# Patient Record
Sex: MALE | Race: WHITE | NOT HISPANIC OR LATINO | Employment: OTHER | URBAN - METROPOLITAN AREA
[De-identification: names, ages, dates, MRNs, and addresses within clinical notes are randomized per-mention and may not be internally consistent; named-entity substitution may affect disease eponyms.]

---

## 2024-01-17 ENCOUNTER — MEDICAL CORRESPONDENCE (OUTPATIENT)
Dept: HEALTH INFORMATION MANAGEMENT | Facility: CLINIC | Age: 79
End: 2024-01-17
Payer: COMMERCIAL

## 2024-01-30 ENCOUNTER — OFFICE VISIT (OUTPATIENT)
Dept: FAMILY MEDICINE | Facility: CLINIC | Age: 79
End: 2024-01-30
Payer: COMMERCIAL

## 2024-01-30 VITALS
DIASTOLIC BLOOD PRESSURE: 64 MMHG | HEIGHT: 70 IN | OXYGEN SATURATION: 100 % | BODY MASS INDEX: 23.62 KG/M2 | SYSTOLIC BLOOD PRESSURE: 96 MMHG | WEIGHT: 165 LBS | TEMPERATURE: 97.8 F | HEART RATE: 75 BPM | RESPIRATION RATE: 16 BRPM

## 2024-01-30 DIAGNOSIS — S06.6XAD SUBARACHNOID HEMATOMA, WITH UNKNOWN LOSS OF CONSCIOUSNESS STATUS, SUBSEQUENT ENCOUNTER: ICD-10-CM

## 2024-01-30 DIAGNOSIS — S72.001A CLOSED FRACTURE OF RIGHT HIP, INITIAL ENCOUNTER (H): ICD-10-CM

## 2024-01-30 DIAGNOSIS — Z01.818 PRE-OP EXAM: Primary | ICD-10-CM

## 2024-01-30 PROBLEM — S72.009A HIP FRACTURE (H): Status: ACTIVE | Noted: 2024-01-30

## 2024-01-30 PROBLEM — S06.6XAA SUBARACHNOID HEMATOMA (H): Status: ACTIVE | Noted: 2024-01-30

## 2024-01-30 LAB
ALBUMIN SERPL BCG-MCNC: 4 G/DL (ref 3.5–5.2)
ALP SERPL-CCNC: 140 U/L (ref 40–150)
ALT SERPL W P-5'-P-CCNC: 20 U/L (ref 0–70)
ANION GAP SERPL CALCULATED.3IONS-SCNC: 9 MMOL/L (ref 7–15)
AST SERPL W P-5'-P-CCNC: 28 U/L (ref 0–45)
BILIRUB SERPL-MCNC: 0.5 MG/DL
BUN SERPL-MCNC: 13.7 MG/DL (ref 8–23)
CALCIUM SERPL-MCNC: 10 MG/DL (ref 8.8–10.2)
CHLORIDE SERPL-SCNC: 104 MMOL/L (ref 98–107)
CREAT SERPL-MCNC: 0.81 MG/DL (ref 0.67–1.17)
DEPRECATED HCO3 PLAS-SCNC: 27 MMOL/L (ref 22–29)
EGFRCR SERPLBLD CKD-EPI 2021: 90 ML/MIN/1.73M2
ERYTHROCYTE [DISTWIDTH] IN BLOOD BY AUTOMATED COUNT: 12.1 % (ref 10–15)
GLUCOSE SERPL-MCNC: 81 MG/DL (ref 70–99)
HCT VFR BLD AUTO: 44.7 % (ref 40–53)
HGB BLD-MCNC: 14.5 G/DL (ref 13.3–17.7)
MCH RBC QN AUTO: 30.4 PG (ref 26.5–33)
MCHC RBC AUTO-ENTMCNC: 32.4 G/DL (ref 31.5–36.5)
MCV RBC AUTO: 94 FL (ref 78–100)
PLATELET # BLD AUTO: 240 10E3/UL (ref 150–450)
POTASSIUM SERPL-SCNC: 4.4 MMOL/L (ref 3.4–5.3)
PROT SERPL-MCNC: 7.4 G/DL (ref 6.4–8.3)
RBC # BLD AUTO: 4.77 10E6/UL (ref 4.4–5.9)
SODIUM SERPL-SCNC: 140 MMOL/L (ref 135–145)
WBC # BLD AUTO: 6.4 10E3/UL (ref 4–11)

## 2024-01-30 PROCEDURE — 36415 COLL VENOUS BLD VENIPUNCTURE: CPT

## 2024-01-30 PROCEDURE — 99204 OFFICE O/P NEW MOD 45 MIN: CPT | Mod: 25

## 2024-01-30 PROCEDURE — 80053 COMPREHEN METABOLIC PANEL: CPT

## 2024-01-30 PROCEDURE — 85027 COMPLETE CBC AUTOMATED: CPT

## 2024-01-30 PROCEDURE — 93000 ELECTROCARDIOGRAM COMPLETE: CPT

## 2024-01-30 NOTE — LETTER
January 31, 2024      Darren Atkinson  [address]  [facility] [address]        Dear ,    We are writing to inform you of your test results.    Your test results fall within normal limits.    Resulted Orders   CBC with platelets   Result Value Ref Range    WBC Count 6.4 4.0 - 11.0 10e3/uL    RBC Count 4.77 4.40 - 5.90 10e6/uL    Hemoglobin 14.5 13.3 - 17.7 g/dL    Hematocrit 44.7 40.0 - 53.0 %    MCV 94 78 - 100 fL    MCH 30.4 26.5 - 33.0 pg    MCHC 32.4 31.5 - 36.5 g/dL    RDW 12.1 10.0 - 15.0 %    Platelet Count 240 150 - 450 10e3/uL   Comprehensive metabolic panel   Result Value Ref Range    Sodium 140 135 - 145 mmol/L      Comment:      Reference intervals for this test were updated on 09/26/2023 to more accurately reflect our healthy population. There may be differences in the flagging of prior results with similar values performed with this method. Interpretation of those prior results can be made in the context of the updated reference intervals.     Potassium 4.4 3.4 - 5.3 mmol/L    Carbon Dioxide (CO2) 27 22 - 29 mmol/L    Anion Gap 9 7 - 15 mmol/L    Urea Nitrogen 13.7 8.0 - 23.0 mg/dL    Creatinine 0.81 0.67 - 1.17 mg/dL    GFR Estimate 90 >60 mL/min/1.73m2    Calcium 10.0 8.8 - 10.2 mg/dL    Chloride 104 98 - 107 mmol/L    Glucose 81 70 - 99 mg/dL    Alkaline Phosphatase 140 40 - 150 U/L      Comment:      Reference intervals for this test were updated on 11/14/2023 to more accurately reflect our healthy population. There may be differences in the flagging of prior results with similar values performed with this method. Interpretation of those prior results can be made in the context of the updated reference intervals.    AST 28 0 - 45 U/L      Comment:      Reference intervals for this test were updated on 6/12/2023 to more accurately reflect our healthy population. There may be differences in the flagging of prior results with similar values performed with this method. Interpretation of  those prior results can be made in the context of the updated reference intervals.    ALT 20 0 - 70 U/L      Comment:      Reference intervals for this test were updated on 6/12/2023 to more accurately reflect our healthy population. There may be differences in the flagging of prior results with similar values performed with this method. Interpretation of those prior results can be made in the context of the updated reference intervals.      Protein Total 7.4 6.4 - 8.3 g/dL    Albumin 4.0 3.5 - 5.2 g/dL    Bilirubin Total 0.5 <=1.2 mg/dL       If you have any questions or concerns, please call the clinic at the number listed above.       Sincerely,      Dipti Mac DNP

## 2024-01-30 NOTE — PROGRESS NOTES
Preoperative Evaluation  [facility]  [address], [address]  [facility] [address]  Phone: [phone]  Primary Provider: No Ref-Primary, Physician  Pre-op Performing Provider: DANAY UMANA  Jan 30, 2024       Darren is a 78 year old, presenting for the following:  Pre-Op Exam      Surgical Information  Surgery/Procedure: Complex right total hip arthroplasty- posterior approach  Surgery Location:  OR  Surgeon: Dr. Trinh  Surgery Date: 02/05/24  Time of Surgery: 1000 am  Where patient plans to recover: At home with family  Fax number for surgical facility: Note does not need to be faxed, will be available electronically in Epic.    Assessment & Plan     The proposed surgical procedure is considered INTERMEDIATE risk.    Pre-op exam  -Cleared for surgery  - CBC with platelets  - Comprehensive metabolic panel  - EKG 12-lead complete w/read - Clinics    Closed fracture of right hip, initial encounter (H)  -Reason for surgery    Subarachnoid hematoma  -History of, due to a fall.  Was hospitalized at time of event but has been stable since            - No identified additional risk factors other than previously addressed    Antiplatelet or Anticoagulation Medication Instructions   - Patient is on no antiplatelet or anticoagulation medications.    Additional Medication Instructions  Patient is on no additional chronic medications    Recommendation  APPROVAL GIVEN to proceed with proposed procedure, without further diagnostic evaluation.      30 minutes spent by me on the date of the encounter doing chart review, history and exam, documentation and further activities per the note    Subjective       HPI related to upcoming procedure: Very pleasant 78-year-old male accompanied by his daughter today for preop evaluation.  Had a fall in September and recently discovered a hip fracture that he plans to undergo surgery for.  He is generally feeling well, on no chronic medications. 50 PPY  smoking history, quit in 2019. No history of lung disease, heart disease, hypertension, diabetes, or kidney disease. Had a back surgery in the past and tolerated this well without anesthesia complications. No CP, SOB, dizziness/lightheadedness, palpitations, edema. Prior to his injury in September, he was able to walk several miles without symptoms and was active. Has since been less active due to mobility issues.        1/30/2024     8:26 AM   Preop Questions   1. Have you ever had a heart attack or stroke? No   2. Have you ever had surgery on your heart or blood vessels, such as a stent placement, a coronary artery bypass, or surgery on an artery in your head, neck, heart, or legs? No   3. Do you have chest pain with activity? No   4. Do you have a history of  heart failure? No   5. Do you currently have a cold, bronchitis or symptoms of other infection? No   6. Do you have a cough, shortness of breath, or wheezing? No   7. Do you or anyone in your family have previous history of blood clots? No   8. Do you or does anyone in your family have a serious bleeding problem such as prolonged bleeding following surgeries or cuts? No   9. Have you ever had problems with anemia or been told to take iron pills? No   10. Have you had any abnormal blood loss such as black, tarry or bloody stools? No   11. Have you ever had a blood transfusion? No   12. Are you willing to have a blood transfusion if it is medically needed before, during, or after your surgery? Yes - Would only consider in emergent situation during surgery. Please address day of surgery as well.   13. Have you or any of your relatives ever had problems with anesthesia? No   14. Do you have sleep apnea, excessive snoring or daytime drowsiness? No   15. Do you have any artifical heart valves or other implanted medical devices like a pacemaker, defibrillator, or continuous glucose monitor? No   16. Do you have artificial joints? No   17. Are you allergic to latex?  "No       Health Care Directive  Patient does not have a Health Care Directive or Living Will: Discussed advance care planning with patient; however, patient declined at this time.    Preoperative Review of    reviewed - Was prescribed diazepam and Norco in September, has not used since.      Status of Chronic Conditions:  See problem list for active medical problems.  Problems all longstanding and stable, except as noted/documented.  See ROS for pertinent symptoms related to these conditions.    Patient Active Problem List    Diagnosis Date Noted    Hip fracture (H) 2024     Priority: Medium      Past Medical History:   Diagnosis Date    Elevated prostate specific antigen (PSA)     Hip fracture (H)     Subarachnoid hematoma (H) 2021     Past Surgical History:   Procedure Laterality Date    BACK SURGERY       No current outpatient medications on file.       No Known Allergies     Social History     Tobacco Use    Smoking status: Former     Packs/day: 1.00     Years: 50.00     Additional pack years: 0.00     Total pack years: 50.00     Types: Cigarettes     Quit date:      Years since quittin.0    Smokeless tobacco: Never   Substance Use Topics    Alcohol use: Not Currently       History   Drug Use Not on file         Review of Systems    Review of Systems  Constitutional, HEENT, cardiovascular, pulmonary, gi and gu systems are negative, except as otherwise noted.  Objective    BP 96/64 (BP Location: Left arm, Patient Position: Sitting, Cuff Size: Adult Regular)   Pulse 75   Temp 97.8  F (36.6  C)   Resp 16   Ht 1.778 m (5' 10\")   Wt 74.8 kg (165 lb)   SpO2 100%   BMI 23.68 kg/m     Estimated body mass index is 23.68 kg/m  as calculated from the following:    Height as of this encounter: 1.778 m (5' 10\").    Weight as of this encounter: 74.8 kg (165 lb).  Physical Exam  GENERAL: alert and no distress  EYES: Eyes grossly normal to inspection, PERRL and conjunctivae and sclerae " "normal  RESP: lungs clear to auscultation - no rales, rhonchi or wheezes  CV: regular rate and rhythm, normal S1 S2, no S3 or S4, no murmur, click or rub, no peripheral edema  ABDOMEN: soft, nontender, no hepatosplenomegaly, no masses and bowel sounds normal  MS: no gross musculoskeletal defects noted, no edema  PSYCH: mentation appears normal, affect normal/bright    No results for input(s): \"HGB\", \"PLT\", \"INR\", \"NA\", \"POTASSIUM\", \"CR\", \"A1C\" in the last 38051 hours.     Diagnostics  Labs pending at this time.  Results will be reviewed when available.   EKG: appears normal, NSR, normal axis, normal intervals, no acute ST/T changes c/w ischemia, no LVH by voltage criteria    Revised Cardiac Risk Index (RCRI)  The patient has the following serious cardiovascular risks for perioperative complications:   - No serious cardiac risks = 0 points     RCRI Interpretation: 0 points: Class I (very low risk - 0.4% complication rate)         Signed Electronically by: Dipti Mac, DNP, APRN, CNP    Copy of this evaluation report is provided to requesting physician.         "

## 2024-01-30 NOTE — PATIENT INSTRUCTIONS
1. One week prior to surgery do not take NSAIDS (Alleve, ibuprofen, aspirin, etc) or any over-the-counter pain medications other than Tylenol.  TYLENOL is the safest pain pill to use before surgery because it does not affect your bleeding time. If Tylenol is not sufficient for pain control talk to me or the surgeon and we will decide what is safe to use.     2. Do not eat anything after midnight (evening of the surgery) and nothing the morning of the surgery.     3. Follow all instructions given by the surgery team. They usually give out a packet. Read it and please follow it precisely. This helps surgical experience and outcomes.     4. If you have any questions do not hesitate to call me or the surgeon/surgical team.

## 2024-02-02 RX ORDER — ACETAMINOPHEN 325 MG/1
2 TABLET ORAL EVERY 6 HOURS PRN
Status: ON HOLD | COMMUNITY
End: 2024-02-07

## 2024-02-02 RX ORDER — POLYETHYLENE GLYCOL 3350 17 G/17G
1 POWDER, FOR SOLUTION ORAL PRN
COMMUNITY

## 2024-02-02 RX ORDER — ASPIRIN 81 MG/1
81 TABLET ORAL DAILY
Status: ON HOLD | COMMUNITY
End: 2024-02-07

## 2024-02-02 RX ORDER — SENNOSIDES 8.6 MG
1 TABLET ORAL DAILY PRN
Status: ON HOLD | COMMUNITY
End: 2024-02-07

## 2024-02-02 RX ORDER — GUAIFENESIN AND DEXTROMETHORPHAN HYDROBROMIDE 100; 10 MG/5ML; MG/5ML
10 SOLUTION ORAL EVERY 4 HOURS PRN
COMMUNITY

## 2024-02-02 RX ORDER — MECLIZINE HYDROCHLORIDE 25 MG/1
1 TABLET ORAL 3 TIMES DAILY PRN
COMMUNITY

## 2024-02-02 NOTE — PROGRESS NOTES
PTA medications updated by Medication Scribe prior to surgery via phone call with patient (last doses completed by Nurse)     Medication history sources: H&P and (Nurse Melanie @ [place], [place]  [phone])  In the past week, patient estimated taking medication this percent of the time: Greater than 90%      Significant changes made to the medication list:  None      Additional medication history information:   None    Medication reconciliation completed by provider prior to medication history? No    Time spent in this activity: 25 minutes    The information provided in this note is only as accurate as the sources available at the time of update(s)      Prior to Admission medications    Medication Sig Last Dose Taking? Auth Provider Long Term End Date   acetaminophen (TYLENOL) 325 MG tablet Take 2 tablets by mouth every 6 hours as needed for mild pain Unknown at prn Yes Reported, Patient     aspirin 81 MG EC tablet Take 81 mg by mouth daily  at am Yes Reported, Patient     Dextromethorphan-guaiFENesin  MG/5ML syrup Take 10 mLs by mouth every 4 hours as needed for cough Unknown at prn Yes Reported, Patient     meclizine (ANTIVERT) 25 MG tablet Take 1 tablet by mouth 3 times daily as needed for dizziness Unknown at prn Yes Reported, Patient     polyethylene glycol (MIRALAX) 17 g packet Take 1 packet by mouth as needed for constipation Unknown at prn Yes Reported, Patient     sennosides (SENOKOT) 8.6 MG tablet Take 1 tablet by mouth daily as needed for constipation Unknown at prn Yes Reported, Patient         Medication history completed by: Mercedes Mustafa LPN

## 2024-02-05 ENCOUNTER — ANESTHESIA (OUTPATIENT)
Dept: SURGERY | Facility: CLINIC | Age: 79
DRG: 522 | End: 2024-02-05
Payer: MEDICARE

## 2024-02-05 ENCOUNTER — APPOINTMENT (OUTPATIENT)
Dept: GENERAL RADIOLOGY | Facility: CLINIC | Age: 79
DRG: 522 | End: 2024-02-05
Attending: PHYSICIAN ASSISTANT
Payer: MEDICARE

## 2024-02-05 ENCOUNTER — APPOINTMENT (OUTPATIENT)
Dept: PHYSICAL THERAPY | Facility: CLINIC | Age: 79
DRG: 522 | End: 2024-02-05
Attending: PHYSICIAN ASSISTANT
Payer: MEDICARE

## 2024-02-05 ENCOUNTER — ANESTHESIA EVENT (OUTPATIENT)
Dept: SURGERY | Facility: CLINIC | Age: 79
DRG: 522 | End: 2024-02-05
Payer: MEDICARE

## 2024-02-05 ENCOUNTER — HOSPITAL ENCOUNTER (INPATIENT)
Facility: CLINIC | Age: 79
LOS: 2 days | Discharge: HOME | DRG: 522 | End: 2024-02-08
Attending: ORTHOPAEDIC SURGERY | Admitting: ORTHOPAEDIC SURGERY
Payer: MEDICARE

## 2024-02-05 DIAGNOSIS — R23.9 ALTERATION IN SKIN INTEGRITY: ICD-10-CM

## 2024-02-05 DIAGNOSIS — Z96.641 S/P TOTAL RIGHT HIP ARTHROPLASTY: Primary | ICD-10-CM

## 2024-02-05 PROBLEM — Z96.649 S/P TOTAL HIP ARTHROPLASTY: Status: ACTIVE | Noted: 2024-02-05

## 2024-02-05 PROCEDURE — 97116 GAIT TRAINING THERAPY: CPT | Mod: GP

## 2024-02-05 PROCEDURE — 258N000003 HC RX IP 258 OP 636: Performed by: SURGERY

## 2024-02-05 PROCEDURE — P9045 ALBUMIN (HUMAN), 5%, 250 ML: HCPCS | Mod: JZ | Performed by: ANESTHESIOLOGY

## 2024-02-05 PROCEDURE — 250N000013 HC RX MED GY IP 250 OP 250 PS 637: Performed by: PHYSICIAN ASSISTANT

## 2024-02-05 PROCEDURE — 97161 PT EVAL LOW COMPLEX 20 MIN: CPT | Mod: GP

## 2024-02-05 PROCEDURE — C1776 JOINT DEVICE (IMPLANTABLE): HCPCS | Performed by: ORTHOPAEDIC SURGERY

## 2024-02-05 PROCEDURE — 250N000011 HC RX IP 250 OP 636: Performed by: PHYSICIAN ASSISTANT

## 2024-02-05 PROCEDURE — 250N000009 HC RX 250: Performed by: REGISTERED NURSE

## 2024-02-05 PROCEDURE — 272N000001 HC OR GENERAL SUPPLY STERILE: Performed by: ORTHOPAEDIC SURGERY

## 2024-02-05 PROCEDURE — 250N000009 HC RX 250: Performed by: ORTHOPAEDIC SURGERY

## 2024-02-05 PROCEDURE — 97530 THERAPEUTIC ACTIVITIES: CPT | Mod: GP

## 2024-02-05 PROCEDURE — 0SR901A REPLACEMENT OF RIGHT HIP JOINT WITH METAL SYNTHETIC SUBSTITUTE, UNCEMENTED, OPEN APPROACH: ICD-10-PCS | Performed by: ORTHOPAEDIC SURGERY

## 2024-02-05 PROCEDURE — 258N000003 HC RX IP 258 OP 636: Performed by: PHYSICIAN ASSISTANT

## 2024-02-05 PROCEDURE — 250N000011 HC RX IP 250 OP 636: Performed by: REGISTERED NURSE

## 2024-02-05 PROCEDURE — C1713 ANCHOR/SCREW BN/BN,TIS/BN: HCPCS | Performed by: ORTHOPAEDIC SURGERY

## 2024-02-05 PROCEDURE — 250N000011 HC RX IP 250 OP 636: Performed by: ANESTHESIOLOGY

## 2024-02-05 PROCEDURE — 710N000009 HC RECOVERY PHASE 1, LEVEL 1, PER MIN: Performed by: ORTHOPAEDIC SURGERY

## 2024-02-05 PROCEDURE — 360N000077 HC SURGERY LEVEL 4, PER MIN: Performed by: ORTHOPAEDIC SURGERY

## 2024-02-05 PROCEDURE — 370N000017 HC ANESTHESIA TECHNICAL FEE, PER MIN: Performed by: ORTHOPAEDIC SURGERY

## 2024-02-05 PROCEDURE — 258N000003 HC RX IP 258 OP 636: Performed by: REGISTERED NURSE

## 2024-02-05 PROCEDURE — 250N000011 HC RX IP 250 OP 636: Performed by: ORTHOPAEDIC SURGERY

## 2024-02-05 PROCEDURE — 250N000013 HC RX MED GY IP 250 OP 250 PS 637: Performed by: ORTHOPAEDIC SURGERY

## 2024-02-05 PROCEDURE — 999N000065 XR PELVIS AND HIP PORTABLE RIGHT 1 VIEW

## 2024-02-05 PROCEDURE — 258N000003 HC RX IP 258 OP 636: Performed by: ORTHOPAEDIC SURGERY

## 2024-02-05 PROCEDURE — 999N000141 HC STATISTIC PRE-PROCEDURE NURSING ASSESSMENT: Performed by: ORTHOPAEDIC SURGERY

## 2024-02-05 PROCEDURE — 258N000001 HC RX 258: Performed by: ORTHOPAEDIC SURGERY

## 2024-02-05 PROCEDURE — 250N000011 HC RX IP 250 OP 636: Mod: JZ | Performed by: ANESTHESIOLOGY

## 2024-02-05 DEVICE — IMPLANTABLE DEVICE
Type: IMPLANTABLE DEVICE | Site: HIP | Status: FUNCTIONAL
Brand: G7® DUAL MOBILITY ACETABULAR SYSTEM

## 2024-02-05 DEVICE — IMP SLEEVE BIOM TYPE1 TPR FOR BIOLOX DELTA +6MM 650-1068: Type: IMPLANTABLE DEVICE | Site: HIP | Status: FUNCTIONAL

## 2024-02-05 DEVICE — IMPLANTABLE DEVICE
Type: IMPLANTABLE DEVICE | Site: HIP | Status: FUNCTIONAL
Brand: ARCOS MODULAR REVISION HIP SYSTEM

## 2024-02-05 DEVICE — IMPLANTABLE DEVICE: Type: IMPLANTABLE DEVICE | Site: HIP | Status: FUNCTIONAL

## 2024-02-05 DEVICE — IMPLANTABLE DEVICE
Type: IMPLANTABLE DEVICE | Site: HIP | Status: FUNCTIONAL
Brand: VIVACIT-E®

## 2024-02-05 DEVICE — IMP HEAD FEM BIOM BIOLOX DELTA OPTION 28MM 650-1055: Type: IMPLANTABLE DEVICE | Site: HIP | Status: FUNCTIONAL

## 2024-02-05 RX ORDER — DEXMEDETOMIDINE HYDROCHLORIDE 4 UG/ML
INJECTION, SOLUTION INTRAVENOUS PRN
Status: DISCONTINUED | OUTPATIENT
Start: 2024-02-05 | End: 2024-02-05

## 2024-02-05 RX ORDER — TRANEXAMIC ACID 650 MG/1
1950 TABLET ORAL ONCE
Status: COMPLETED | OUTPATIENT
Start: 2024-02-05 | End: 2024-02-05

## 2024-02-05 RX ORDER — MAGNESIUM HYDROXIDE 1200 MG/15ML
LIQUID ORAL PRN
Status: DISCONTINUED | OUTPATIENT
Start: 2024-02-05 | End: 2024-02-05 | Stop reason: HOSPADM

## 2024-02-05 RX ORDER — NALOXONE HYDROCHLORIDE 0.4 MG/ML
0.4 INJECTION, SOLUTION INTRAMUSCULAR; INTRAVENOUS; SUBCUTANEOUS
Status: DISCONTINUED | OUTPATIENT
Start: 2024-02-05 | End: 2024-02-08 | Stop reason: HOSPADM

## 2024-02-05 RX ORDER — CEFAZOLIN SODIUM 1 G/3ML
1 INJECTION, POWDER, FOR SOLUTION INTRAMUSCULAR; INTRAVENOUS EVERY 8 HOURS
Qty: 10 ML | Refills: 0 | Status: ACTIVE | OUTPATIENT
Start: 2024-02-05 | End: 2024-02-06

## 2024-02-05 RX ORDER — PROPOFOL 10 MG/ML
INJECTION, EMULSION INTRAVENOUS CONTINUOUS PRN
Status: DISCONTINUED | OUTPATIENT
Start: 2024-02-05 | End: 2024-02-05

## 2024-02-05 RX ORDER — HYDROMORPHONE HYDROCHLORIDE 2 MG/1
1-2 TABLET ORAL EVERY 4 HOURS PRN
Qty: 25 TABLET | Refills: 0 | Status: SHIPPED | OUTPATIENT
Start: 2024-02-05 | End: 2024-02-07

## 2024-02-05 RX ORDER — FENTANYL CITRATE 0.05 MG/ML
50 INJECTION, SOLUTION INTRAMUSCULAR; INTRAVENOUS EVERY 5 MIN PRN
Status: DISCONTINUED | OUTPATIENT
Start: 2024-02-05 | End: 2024-02-05 | Stop reason: HOSPADM

## 2024-02-05 RX ORDER — CEFAZOLIN SODIUM/WATER 2 G/20 ML
2 SYRINGE (ML) INTRAVENOUS
Status: COMPLETED | OUTPATIENT
Start: 2024-02-05 | End: 2024-02-05

## 2024-02-05 RX ORDER — NALOXONE HYDROCHLORIDE 0.4 MG/ML
0.2 INJECTION, SOLUTION INTRAMUSCULAR; INTRAVENOUS; SUBCUTANEOUS
Status: DISCONTINUED | OUTPATIENT
Start: 2024-02-05 | End: 2024-02-08 | Stop reason: HOSPADM

## 2024-02-05 RX ORDER — ASPIRIN 81 MG/1
81 TABLET ORAL 2 TIMES DAILY
Qty: 60 TABLET | Refills: 0 | Status: SHIPPED | OUTPATIENT
Start: 2024-02-05

## 2024-02-05 RX ORDER — ONDANSETRON 2 MG/ML
INJECTION INTRAMUSCULAR; INTRAVENOUS PRN
Status: DISCONTINUED | OUTPATIENT
Start: 2024-02-05 | End: 2024-02-05

## 2024-02-05 RX ORDER — ACETAMINOPHEN 325 MG/1
975 TABLET ORAL ONCE
Status: COMPLETED | OUTPATIENT
Start: 2024-02-05 | End: 2024-02-05

## 2024-02-05 RX ORDER — ACETAMINOPHEN 325 MG/1
650 TABLET ORAL EVERY 4 HOURS PRN
Status: DISCONTINUED | OUTPATIENT
Start: 2024-02-08 | End: 2024-02-08 | Stop reason: HOSPADM

## 2024-02-05 RX ORDER — FENTANYL CITRATE 0.05 MG/ML
25 INJECTION, SOLUTION INTRAMUSCULAR; INTRAVENOUS EVERY 5 MIN PRN
Status: DISCONTINUED | OUTPATIENT
Start: 2024-02-05 | End: 2024-02-05 | Stop reason: HOSPADM

## 2024-02-05 RX ORDER — DEXAMETHASONE SODIUM PHOSPHATE 4 MG/ML
INJECTION, SOLUTION INTRA-ARTICULAR; INTRALESIONAL; INTRAMUSCULAR; INTRAVENOUS; SOFT TISSUE PRN
Status: DISCONTINUED | OUTPATIENT
Start: 2024-02-05 | End: 2024-02-05

## 2024-02-05 RX ORDER — AMOXICILLIN 250 MG
1-2 CAPSULE ORAL 2 TIMES DAILY
Qty: 30 TABLET | Refills: 0 | Status: SHIPPED | OUTPATIENT
Start: 2024-02-05

## 2024-02-05 RX ORDER — ONDANSETRON 2 MG/ML
4 INJECTION INTRAMUSCULAR; INTRAVENOUS EVERY 6 HOURS PRN
Status: DISCONTINUED | OUTPATIENT
Start: 2024-02-05 | End: 2024-02-08 | Stop reason: HOSPADM

## 2024-02-05 RX ORDER — MECLIZINE HYDROCHLORIDE 25 MG/1
25 TABLET ORAL 3 TIMES DAILY PRN
Status: DISCONTINUED | OUTPATIENT
Start: 2024-02-05 | End: 2024-02-08 | Stop reason: HOSPADM

## 2024-02-05 RX ORDER — POLYETHYLENE GLYCOL 3350 17 G/17G
1 POWDER, FOR SOLUTION ORAL DAILY
Qty: 7 PACKET | Refills: 0 | Status: SHIPPED | OUTPATIENT
Start: 2024-02-05

## 2024-02-05 RX ORDER — LIDOCAINE HYDROCHLORIDE 20 MG/ML
INJECTION, SOLUTION INFILTRATION; PERINEURAL PRN
Status: DISCONTINUED | OUTPATIENT
Start: 2024-02-05 | End: 2024-02-05

## 2024-02-05 RX ORDER — ASPIRIN 81 MG/1
81 TABLET ORAL 2 TIMES DAILY
Status: DISCONTINUED | OUTPATIENT
Start: 2024-02-05 | End: 2024-02-08 | Stop reason: HOSPADM

## 2024-02-05 RX ORDER — HYDROMORPHONE HCL IN WATER/PF 6 MG/30 ML
0.2 PATIENT CONTROLLED ANALGESIA SYRINGE INTRAVENOUS EVERY 5 MIN PRN
Status: DISCONTINUED | OUTPATIENT
Start: 2024-02-05 | End: 2024-02-05 | Stop reason: HOSPADM

## 2024-02-05 RX ORDER — PROCHLORPERAZINE MALEATE 5 MG
5 TABLET ORAL EVERY 6 HOURS PRN
Status: DISCONTINUED | OUTPATIENT
Start: 2024-02-05 | End: 2024-02-08 | Stop reason: HOSPADM

## 2024-02-05 RX ORDER — GUAIFENESIN AND DEXTROMETHORPHAN HYDROBROMIDE 100; 10 MG/5ML; MG/5ML
10 SOLUTION ORAL EVERY 4 HOURS PRN
Status: DISCONTINUED | OUTPATIENT
Start: 2024-02-05 | End: 2024-02-08 | Stop reason: HOSPADM

## 2024-02-05 RX ORDER — EPHEDRINE SULFATE 50 MG/ML
INJECTION, SOLUTION INTRAMUSCULAR; INTRAVENOUS; SUBCUTANEOUS PRN
Status: DISCONTINUED | OUTPATIENT
Start: 2024-02-05 | End: 2024-02-05

## 2024-02-05 RX ORDER — ONDANSETRON 4 MG/1
4 TABLET, ORALLY DISINTEGRATING ORAL EVERY 6 HOURS PRN
Status: DISCONTINUED | OUTPATIENT
Start: 2024-02-05 | End: 2024-02-08 | Stop reason: HOSPADM

## 2024-02-05 RX ORDER — SODIUM CHLORIDE, SODIUM LACTATE, POTASSIUM CHLORIDE, CALCIUM CHLORIDE 600; 310; 30; 20 MG/100ML; MG/100ML; MG/100ML; MG/100ML
INJECTION, SOLUTION INTRAVENOUS CONTINUOUS
Status: DISCONTINUED | OUTPATIENT
Start: 2024-02-05 | End: 2024-02-05 | Stop reason: HOSPADM

## 2024-02-05 RX ORDER — POLYETHYLENE GLYCOL 3350 17 G/17G
17 POWDER, FOR SOLUTION ORAL DAILY
Status: DISCONTINUED | OUTPATIENT
Start: 2024-02-06 | End: 2024-02-08 | Stop reason: HOSPADM

## 2024-02-05 RX ORDER — ONDANSETRON 4 MG/1
4 TABLET, ORALLY DISINTEGRATING ORAL EVERY 30 MIN PRN
Status: DISCONTINUED | OUTPATIENT
Start: 2024-02-05 | End: 2024-02-05 | Stop reason: HOSPADM

## 2024-02-05 RX ORDER — BISACODYL 10 MG
10 SUPPOSITORY, RECTAL RECTAL DAILY PRN
Status: DISCONTINUED | OUTPATIENT
Start: 2024-02-05 | End: 2024-02-08 | Stop reason: HOSPADM

## 2024-02-05 RX ORDER — CEFAZOLIN SODIUM/WATER 2 G/20 ML
2 SYRINGE (ML) INTRAVENOUS SEE ADMIN INSTRUCTIONS
Status: DISCONTINUED | OUTPATIENT
Start: 2024-02-05 | End: 2024-02-05 | Stop reason: HOSPADM

## 2024-02-05 RX ORDER — HYDROMORPHONE HCL IN WATER/PF 6 MG/30 ML
0.2 PATIENT CONTROLLED ANALGESIA SYRINGE INTRAVENOUS
Status: DISCONTINUED | OUTPATIENT
Start: 2024-02-05 | End: 2024-02-08 | Stop reason: HOSPADM

## 2024-02-05 RX ORDER — BUPIVACAINE HYDROCHLORIDE 7.5 MG/ML
INJECTION, SOLUTION INTRASPINAL
Status: DISCONTINUED | OUTPATIENT
Start: 2024-02-05 | End: 2024-02-05

## 2024-02-05 RX ORDER — SODIUM CHLORIDE 9 MG/ML
INJECTION, SOLUTION INTRAVENOUS CONTINUOUS
Status: DISCONTINUED | OUTPATIENT
Start: 2024-02-05 | End: 2024-02-08 | Stop reason: HOSPADM

## 2024-02-05 RX ORDER — HYDROMORPHONE HCL IN WATER/PF 6 MG/30 ML
0.4 PATIENT CONTROLLED ANALGESIA SYRINGE INTRAVENOUS
Status: DISCONTINUED | OUTPATIENT
Start: 2024-02-05 | End: 2024-02-08 | Stop reason: HOSPADM

## 2024-02-05 RX ORDER — ACETAMINOPHEN 325 MG/1
650 TABLET ORAL EVERY 4 HOURS PRN
Qty: 100 TABLET | Refills: 0 | Status: SHIPPED | OUTPATIENT
Start: 2024-02-05

## 2024-02-05 RX ORDER — ACETAMINOPHEN 325 MG/1
975 TABLET ORAL EVERY 8 HOURS
Qty: 27 TABLET | Refills: 0 | Status: COMPLETED | OUTPATIENT
Start: 2024-02-05 | End: 2024-02-08

## 2024-02-05 RX ORDER — AMOXICILLIN 250 MG
1 CAPSULE ORAL 2 TIMES DAILY
Status: DISCONTINUED | OUTPATIENT
Start: 2024-02-05 | End: 2024-02-08 | Stop reason: HOSPADM

## 2024-02-05 RX ORDER — HYDROMORPHONE HYDROCHLORIDE 2 MG/1
2 TABLET ORAL EVERY 4 HOURS PRN
Status: DISCONTINUED | OUTPATIENT
Start: 2024-02-05 | End: 2024-02-08 | Stop reason: HOSPADM

## 2024-02-05 RX ORDER — ONDANSETRON 2 MG/ML
4 INJECTION INTRAMUSCULAR; INTRAVENOUS EVERY 30 MIN PRN
Status: DISCONTINUED | OUTPATIENT
Start: 2024-02-05 | End: 2024-02-05 | Stop reason: HOSPADM

## 2024-02-05 RX ORDER — METHOCARBAMOL 500 MG/1
500 TABLET, FILM COATED ORAL EVERY 6 HOURS PRN
Status: DISCONTINUED | OUTPATIENT
Start: 2024-02-05 | End: 2024-02-08 | Stop reason: HOSPADM

## 2024-02-05 RX ORDER — LIDOCAINE 40 MG/G
CREAM TOPICAL
Status: DISCONTINUED | OUTPATIENT
Start: 2024-02-05 | End: 2024-02-08 | Stop reason: HOSPADM

## 2024-02-05 RX ORDER — PROPOFOL 10 MG/ML
INJECTION, EMULSION INTRAVENOUS PRN
Status: DISCONTINUED | OUTPATIENT
Start: 2024-02-05 | End: 2024-02-05

## 2024-02-05 RX ORDER — HYDROMORPHONE HCL IN WATER/PF 6 MG/30 ML
0.4 PATIENT CONTROLLED ANALGESIA SYRINGE INTRAVENOUS EVERY 5 MIN PRN
Status: DISCONTINUED | OUTPATIENT
Start: 2024-02-05 | End: 2024-02-05 | Stop reason: HOSPADM

## 2024-02-05 RX ADMIN — PHENYLEPHRINE HYDROCHLORIDE 100 MCG: 10 INJECTION INTRAVENOUS at 10:41

## 2024-02-05 RX ADMIN — SODIUM CHLORIDE, POTASSIUM CHLORIDE, SODIUM LACTATE AND CALCIUM CHLORIDE: 600; 310; 30; 20 INJECTION, SOLUTION INTRAVENOUS at 09:15

## 2024-02-05 RX ADMIN — ONDANSETRON 4 MG: 2 INJECTION INTRAMUSCULAR; INTRAVENOUS at 12:05

## 2024-02-05 RX ADMIN — PHENYLEPHRINE HYDROCHLORIDE 0.5 MCG/KG/MIN: 10 INJECTION INTRAVENOUS at 10:44

## 2024-02-05 RX ADMIN — ACETAMINOPHEN 975 MG: 325 TABLET, FILM COATED ORAL at 16:16

## 2024-02-05 RX ADMIN — LIDOCAINE HYDROCHLORIDE 60 MG: 20 INJECTION, SOLUTION INFILTRATION; PERINEURAL at 10:16

## 2024-02-05 RX ADMIN — DEXAMETHASONE SODIUM PHOSPHATE 10 MG: 4 INJECTION, SOLUTION INTRA-ARTICULAR; INTRALESIONAL; INTRAMUSCULAR; INTRAVENOUS; SOFT TISSUE at 10:17

## 2024-02-05 RX ADMIN — SENNOSIDES AND DOCUSATE SODIUM 1 TABLET: 50; 8.6 TABLET ORAL at 20:14

## 2024-02-05 RX ADMIN — TRANEXAMIC ACID 1950 MG: 650 TABLET ORAL at 09:17

## 2024-02-05 RX ADMIN — PHENYLEPHRINE HYDROCHLORIDE 100 MCG: 10 INJECTION INTRAVENOUS at 10:33

## 2024-02-05 RX ADMIN — ALBUMIN (HUMAN) 12.5 G: 12.5 SOLUTION INTRAVENOUS at 13:15

## 2024-02-05 RX ADMIN — BUPIVACAINE HYDROCHLORIDE IN DEXTROSE 1.6 ML: 7.5 INJECTION, SOLUTION SUBARACHNOID at 10:16

## 2024-02-05 RX ADMIN — CEFAZOLIN 1 G: 1 INJECTION, POWDER, FOR SOLUTION INTRAMUSCULAR; INTRAVENOUS at 17:38

## 2024-02-05 RX ADMIN — ACETAMINOPHEN 975 MG: 325 TABLET, FILM COATED ORAL at 09:17

## 2024-02-05 RX ADMIN — PHENYLEPHRINE HYDROCHLORIDE 100 MCG: 10 INJECTION INTRAVENOUS at 10:46

## 2024-02-05 RX ADMIN — SODIUM CHLORIDE: 9 INJECTION, SOLUTION INTRAVENOUS at 16:13

## 2024-02-05 RX ADMIN — PHENYLEPHRINE HYDROCHLORIDE 100 MCG: 10 INJECTION INTRAVENOUS at 10:24

## 2024-02-05 RX ADMIN — PROPOFOL 10 MG: 10 INJECTION, EMULSION INTRAVENOUS at 10:10

## 2024-02-05 RX ADMIN — Medication 5 MG: at 10:55

## 2024-02-05 RX ADMIN — SODIUM CHLORIDE, POTASSIUM CHLORIDE, SODIUM LACTATE AND CALCIUM CHLORIDE: 600; 310; 30; 20 INJECTION, SOLUTION INTRAVENOUS at 11:51

## 2024-02-05 RX ADMIN — PROPOFOL 150 MCG/KG/MIN: 10 INJECTION, EMULSION INTRAVENOUS at 10:16

## 2024-02-05 RX ADMIN — Medication 5 MG: at 10:32

## 2024-02-05 RX ADMIN — ASPIRIN 81 MG: 81 TABLET, COATED ORAL at 20:14

## 2024-02-05 RX ADMIN — PROPOFOL 10 MG: 10 INJECTION, EMULSION INTRAVENOUS at 12:14

## 2024-02-05 RX ADMIN — Medication 2 G: at 10:04

## 2024-02-05 RX ADMIN — PHENYLEPHRINE HYDROCHLORIDE 100 MCG: 10 INJECTION INTRAVENOUS at 10:37

## 2024-02-05 RX ADMIN — DEXMEDETOMIDINE HYDROCHLORIDE 8 MCG: 200 INJECTION INTRAVENOUS at 10:10

## 2024-02-05 ASSESSMENT — ENCOUNTER SYMPTOMS
SEIZURES: 0
ORTHOPNEA: 0

## 2024-02-05 ASSESSMENT — ACTIVITIES OF DAILY LIVING (ADL)
ADLS_ACUITY_SCORE: 23
ADLS_ACUITY_SCORE: 29
ADLS_ACUITY_SCORE: 23
ADLS_ACUITY_SCORE: 35
ADLS_ACUITY_SCORE: 23
ADLS_ACUITY_SCORE: 29

## 2024-02-05 ASSESSMENT — LIFESTYLE VARIABLES: TOBACCO_USE: 1

## 2024-02-05 NOTE — ANESTHESIA POSTPROCEDURE EVALUATION
Patient: Darren Atkinson    Procedure: Procedure(s):  complex right total hip arthroplasty - posterior approach       Anesthesia Type:  Spinal    Note:  Disposition: Admission   Postop Pain Control: Uneventful            Sign Out: Well controlled pain   PONV: No   Neuro/Psych: Uneventful            Sign Out: Acceptable/Baseline neuro status   Airway/Respiratory: Uneventful            Sign Out: Acceptable/Baseline resp. status   CV/Hemodynamics: Uneventful            Sign Out: Acceptable CV status; No obvious hypovolemia; No obvious fluid overload   Other NRE: NONE   DID A NON-ROUTINE EVENT OCCUR? No           Last vitals:  Vitals Value Taken Time   BP 88/61 02/05/24 1445   Temp 36.3  C (97.4  F) 02/05/24 1430   Pulse 77 02/05/24 1448   Resp 45 02/05/24 1448   SpO2 97 % 02/05/24 1448   Vitals shown include unfiled device data.    Electronically Signed By: Dipti Spring MD  February 5, 2024  2:49 PM

## 2024-02-05 NOTE — PROGRESS NOTES
Provided care during RN break. Pt. Alert to self and place. Denies pain and no nonverbal signs of pain. Pt. Moving extremities, cool feet, color WNL, Weak pedal pulses, difficulty finding left pedal pulses. MD Trinh assessed, no concerns. SBP in 80s and 90s. Baseline of 90s. Due to void. Report given. Attempted to call daughter to update, went to voicemail.

## 2024-02-05 NOTE — BRIEF OP NOTE
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  Complications: None.  Implants:   Implant Name Type Inv. Item Serial No.  Lot No. LRB No. Used Action   SHELL G7 OSSEO TI MULTIHOLE ACET 56MM - SN/A Total Joint Component/Insert SHELL G7 OSSEO TI MULTIHOLE ACET 56MM N/A [employer] [id number] Right 1 Implanted   IMP SCR ZIM 6.5X25MM ACET CUP SELF TAP -581-25 - [id number] Metallic Hardware/Gustavus IMP SCR ZIM 6.5X25MM ACET CUP SELF TAP -065-25  [employer] [id number] Right 1 Implanted   IMP SCR ZIM 6.5X30MM ACET CUP SELF TAP -065-30 - [id number] Metallic Hardware/Gustavus IMP SCR ZIM 6.5X30MM ACET CUP SELF TAP -992-30  [employer] [id number] Right 1 Implanted   IMP SCR ZIM 6.5X25MM ACET CUP SELF TAP -963-25 - [id number] Metallic Hardware/Gustavus IMP SCR ZIM 6.5X25MM ACET CUP SELF TAP -079-25  [employer] [id number] Right 1 Implanted   IMP SCR ZIM 6.5X25MM ACET CUP SELF TAP -369-25 - [id number] Metallic Hardware/Gustavus IMP SCR ZIM 6.5X25MM ACET CUP SELF TAP -823-25  [employer] [id number] Right 1 Implanted   STS DISTAL STEM ALDA MODULAR REVISION SYSTEM   N/A  [id number] Right 1 Implanted   LINER ACTB G7 F 44MM COCR HIP 2 MBL - [id number] Total Joint Component/Insert LINER ACTB G7 F 44MM COCR HIP 2 MBL  [employer] [id number] Right 1 Implanted   IMP CONE PROX BODY BIOMET HI OFFSET TYPE 1 TPR SZ A 50MM - SN/A Total Joint Component/Insert IMP CONE PROX BODY BIOMET HI OFFSET TYPE 1  TPR SZ A 50MM N/A [employer] [id number] Right 1 Implanted   BEARING HIP 44MM 28MM F VIVACIT-E LUM STRL LF - [id number] Total Joint Component/Insert BEARING HIP 44MM 28MM F VIVACIT-E LUM STRL LF  [employer] [id number] Right 1 Implanted   IMP HEAD FEM BIOM BIOLOX DELTA OPTION 28MM 650-1055 - [id number] Total Joint Component/Insert IMP HEAD FEM BIOM BIOLOX DELTA OPTION 28MM 650-1055  [employer] [id number] Right 1 Implanted   IMP SLEEVE BIOM TYPE1 TPR FOR BIOLOX DELTA +6MM 650-1068 - [id number] Total Joint Component/Insert IMP SLEEVE BIOM TYPE1 TPR FOR BIOLOX DELTA +6MM 650-1068  [employer] [id number] Right 1 Implanted

## 2024-02-05 NOTE — ANESTHESIA PROCEDURE NOTES
"Intrathecal injection Procedure Note    Pre-Procedure   Staff -        Anesthesiologist:  Dipti Spring MD       Performed By: anesthesiologist       Location: OR       Pre-Anesthestic Checklist: patient identified, IV checked, risks and benefits discussed, informed consent, monitors and equipment checked, pre-op evaluation, at physician/surgeon's request and post-op pain management  Timeout:       Correct Patient: Yes        Correct Procedure: Yes        Correct Site: Yes        Correct Position: Yes   Procedure Documentation  Procedure: intrathecal injection       Diagnosis: osteoarthritis       Patient Position: sitting       Patient Prep/Sterile Barriers: sterile gloves, mask, patient draped       Skin prep: Chloraprep       Insertion Site: L3-4. (midline approach).       Needle Gauge: 24.        Needle Length (Inches): 3.5        Spinal Needle Type: Pencan       Introducer used       Introducer: 20 G       # of attempts: 1 and  # of redirects:  1    Assessment/Narrative         Paresthesias: No.       CSF fluid: clear.    Medication(s) Administered   0.75% Hyperbaric Bupivacaine (Intrathecal) - Intrathecal   1.6 mL - 2/5/2024 10:16:00 AM    FOR [facility] ([facility] ONLY:   Pain Team Contact information: please page the Pain Team Via [employer]. Search \"Pain\". During daytime hours, please page the attending first. At night please page the resident first.      "

## 2024-02-05 NOTE — ANESTHESIA PREPROCEDURE EVALUATION
Anesthesia Pre-Procedure Evaluation    Patient: Darren Atkinson   MRN: [id number] : 1945        Procedure : Procedure(s):  complex right total hip arthroplasty - posterior approach          Past Medical History:   Diagnosis Date    Elevated prostate specific antigen (PSA)     Hip fracture (H)     Memory loss     Subarachnoid hematoma (H) 2021      Past Surgical History:   Procedure Laterality Date    BACK SURGERY        Allergies   Allergen Reactions    Morphine Hallucination    Oxycodone Hallucination      Social History     Tobacco Use    Smoking status: Former     Packs/day: 1.00     Years: 50.00     Additional pack years: 0.00     Total pack years: 50.00     Types: Cigarettes     Quit date:      Years since quittin.0    Smokeless tobacco: Never   Substance Use Topics    Alcohol use: Not Currently      Wt Readings from Last 1 Encounters:   24 74.8 kg (165 lb)        Anesthesia Evaluation   Pt has had prior anesthetic.     No history of anesthetic complications       ROS/MED HX  ENT/Pulmonary:     (+)                tobacco use, Past use,                    (-) asthma and recent URI   Neurologic: Comment: Subarachnoid hematoma   (-) no seizures, no CVA and no TIA   Cardiovascular:    (-) orthopnea/PND   METS/Exercise Tolerance: >4 METS    Hematologic:    (-) history of blood clots   Musculoskeletal:   (+)  arthritis,   fracture (RIGHT hip),          GI/Hepatic:    (-) GERD and liver disease   Renal/Genitourinary:    (-) renal disease   Endo:    (-) Type II DM and obesity   Psychiatric/Substance Use:       Infectious Disease:    (-) Recent Fever   Malignancy:       Other:            Physical Exam    Airway        Mallampati: II   TM distance: > 3 FB   Neck ROM: full   Mouth opening: > 3 cm    Respiratory Devices and Support         Dental       (+) Minor Abnormalities - some fillings, tiny chips      Cardiovascular          Rhythm and rate: regular and normal     Pulmonary            "breath sounds clear to auscultation           OUTSIDE LABS:  CBC:   Lab Results   Component Value Date    WBC 6.4 01/30/2024    HGB 14.5 01/30/2024    HCT 44.7 01/30/2024     01/30/2024     BMP:   Lab Results   Component Value Date     01/30/2024    POTASSIUM 4.4 01/30/2024    CHLORIDE 104 01/30/2024    CO2 27 01/30/2024    BUN 13.7 01/30/2024    CR 0.81 01/30/2024    GLC 81 01/30/2024     COAGS: No results found for: \"PTT\", \"INR\", \"FIBR\"  POC: No results found for: \"BGM\", \"HCG\", \"HCGS\"  HEPATIC:   Lab Results   Component Value Date    ALBUMIN 4.0 01/30/2024    PROTTOTAL 7.4 01/30/2024    ALT 20 01/30/2024    AST 28 01/30/2024    ALKPHOS 140 01/30/2024    BILITOTAL 0.5 01/30/2024     OTHER:   Lab Results   Component Value Date    ROMAIN 10.0 01/30/2024       Anesthesia Plan    ASA Status:  2    NPO Status:  NPO Appropriate    Anesthesia Type: Spinal.      Maintenance: TIVA.        Consents    Anesthesia Plan(s) and associated risks, benefits, and realistic alternatives discussed. Questions answered and patient/representative(s) expressed understanding.     - Discussed: Risks, Benefits and Alternatives for the PROCEDURE were discussed     - Discussed with:  Patient (& adult daughter)            Postoperative Care    Pain management: IV analgesics, Oral pain medications, Multi-modal analgesia.   PONV prophylaxis: Ondansetron (or other 5HT-3)     Comments:               Ditpi Spring MD    I have reviewed the pertinent notes and labs in the chart from the past 30 days and (re)examined the patient.  Any updates or changes from those notes are reflected in this note.             # Drug Induced Platelet Defect: home medication list includes an antiplatelet medication    "

## 2024-02-05 NOTE — ANESTHESIA CARE TRANSFER NOTE
Patient: Darren Atkinson    Procedure: Procedure(s):  complex right total hip arthroplasty - posterior approach       Diagnosis: Osteoarthritis of right hip [M16.11]  Diagnosis Additional Information: No value filed.    Anesthesia Type:   Spinal     Note:    Oropharynx: oropharynx clear of all foreign objects and spontaneously breathing  Level of Consciousness: drowsy  Oxygen Supplementation: face mask  Level of Supplemental Oxygen (L/min / FiO2): 8  Independent Airway: airway patency satisfactory and stable  Dentition: dentition unchanged  Vital Signs Stable: post-procedure vital signs reviewed and stable  Report to RN Given: handoff report given  Patient transferred to: PACU    Handoff Report: Identifed the Patient, Identified the Reponsible Provider, Reviewed the pertinent medical history, Discussed the surgical course, Reviewed Intra-OP anesthesia mangement and issues during anesthesia, Set expectations for post-procedure period and Allowed opportunity for questions and acknowledgement of understanding  Vitals:  Vitals Value Taken Time   BP 92/65 02/05/24 1237   Temp     Pulse 82 02/05/24 1238   Resp 21 02/05/24 1238   SpO2 100 % 02/05/24 1238   Vitals shown include unfiled device data.    Electronically Signed By: NIALL Vega CRNA  February 5, 2024  12:40 PM

## 2024-02-05 NOTE — PLAN OF CARE
Goal Outcome Evaluation:  Patient vital signs are at baseline: Yes  Patient able to ambulate as they were prior to admission or with assist devices provided by therapies during their stay:  No,  Reason:  Up with 1/walker.   Patient MUST void prior to discharge:  Yes  Patient able to tolerate oral intake:  Yes  Pain has adequate pain control using Oral analgesics:  Yes  Does patient have an identified :  Yes  Has goal D/C date and time been discussed with patient:  Yes   Alert to self only, from memory care unit. Pleasantly confused. IVF infusing, DTV. Denies pain. Right hip dreg CDI. Pt has small wound on his right buttocks, WOC consulted. Left lateral  hip has small dry wound scar. Turn and repos for comfort. Daughter at bedside.

## 2024-02-05 NOTE — OP NOTE
2/5/2024    Darren Atkinson    PREOPERATIVE DIAGNOSIS: Chronic nonunion right femoral neck fracture    POSTOPERATIVE DIAGNOSIS: Same    PROCEDURE: Complex primary right total hip replacement    Anesthesia: General    SURGEON: Dr. Thee Trinh    ASSISTANT: Sarahi Cano PA-C    Note: This was a complex total hip replacement due to the fact that this was a very old nonunion of right femoral neck fracture, with significant bone loss, severe scarring, and shortening of the limb.  This required a good deal of extra dissection to mobilize the femur.  He required use of revision components.    D here ESCRIPTION OF PROCEDURE:      The patient was brought into the operating room, and initially positioned supine on the operative table.  General anesthesia was induced.  Prophylactic IV antibiotics were given.  He is positioned in the left lateral decubitus position.  An axillary roll and pelvic positioner utilized, down extremities were very carefully padded.  The right lower extremity, buttock, groin, and flank were all prepped and draped in customary fashion.    A posterior approach to the hip was chosen, standard skin incision is made, but lengthened slightly distally.  Dissection is carried down to the muscular fascia, which was divided line with the skin incision.  The gluteus gary is divided in line with its fibers.  The Charnley retractor was placed.    The anatomy is quite distorted, as the femur is translocated proximally.  Dissection was carried directly on the cortex of the femur, until we find her way into the hip.  Ultimately, the posterior hip capsule is excised.  The labrum  is a is excised.  Serial spherical concentric reaming is performed up to 55 mm, and a Henri multihole 56 mm cup was impacted into place.  A total of 4 screws were placed up into the pelvis, as the bone is quite soft.  It is soft due to a prolonged period of inadequate weightbearing.    Due to the distorted anatomy, we elect to use  the Ariane stem, and a dual mobility cup for stability.  The size F trial liner was placed and the shell.  It is necessary to gradually and carefully excised bone from the proximal femur until the canal can be identified.  More this is reamed with the Ariane reamers up to size 13.  A 13 x 150 stem was impacted into place.  We reamed for an A body.  Ultimately, we choose the 50 mm body.  The 60 mm gives us too much length.  Based on trial reductions, we ultimately choose the +6 femoral head, on top of the 50 mm a body set in appropriate anteversion.  We have to pay very careful attention to the version of the femoral neck.  At final reduction, we appear to have equal leg lengths, at least as well as we can measure.  We have a flexion contracture of approximately 20 degrees which we are unable to cure.    The wound is copiously irrigated.  The fascia was closed with interrupted 0 Vicryl figure-of-eight sutures.  2-0 Vicryl was used in the subcutaneous tissues, and the skin is reapproximated.  A bulky sterile dressing and abduction pillow were applied and the patient stable recovery in satisfactory condition.  Final counts are correct.

## 2024-02-06 ENCOUNTER — APPOINTMENT (OUTPATIENT)
Dept: PHYSICAL THERAPY | Facility: CLINIC | Age: 79
DRG: 522 | End: 2024-02-06
Attending: PHYSICIAN ASSISTANT
Payer: MEDICARE

## 2024-02-06 PROBLEM — R41.0 CONFUSION, POSTOPERATIVE: Status: ACTIVE | Noted: 2024-02-06

## 2024-02-06 LAB
FASTING STATUS PATIENT QL REPORTED: NO
GLUCOSE SERPL-MCNC: 93 MG/DL (ref 70–99)
HGB BLD-MCNC: 11.1 G/DL (ref 13.3–17.7)

## 2024-02-06 PROCEDURE — 97116 GAIT TRAINING THERAPY: CPT | Mod: GP | Performed by: PHYSICAL THERAPIST

## 2024-02-06 PROCEDURE — 250N000009 HC RX 250

## 2024-02-06 PROCEDURE — 250N000013 HC RX MED GY IP 250 OP 250 PS 637: Performed by: PHYSICIAN ASSISTANT

## 2024-02-06 PROCEDURE — G0463 HOSPITAL OUTPT CLINIC VISIT: HCPCS

## 2024-02-06 PROCEDURE — 250N000013 HC RX MED GY IP 250 OP 250 PS 637: Performed by: INTERNAL MEDICINE

## 2024-02-06 PROCEDURE — 250N000011 HC RX IP 250 OP 636: Performed by: PHYSICIAN ASSISTANT

## 2024-02-06 PROCEDURE — 97530 THERAPEUTIC ACTIVITIES: CPT | Mod: GP | Performed by: PHYSICAL THERAPIST

## 2024-02-06 PROCEDURE — 999N000111 HC STATISTIC OT IP EVAL DEFER

## 2024-02-06 PROCEDURE — 36415 COLL VENOUS BLD VENIPUNCTURE: CPT | Performed by: PHYSICIAN ASSISTANT

## 2024-02-06 PROCEDURE — 99232 SBSQ HOSP IP/OBS MODERATE 35: CPT | Performed by: INTERNAL MEDICINE

## 2024-02-06 PROCEDURE — 82947 ASSAY GLUCOSE BLOOD QUANT: CPT | Performed by: ORTHOPAEDIC SURGERY

## 2024-02-06 PROCEDURE — 85018 HEMOGLOBIN: CPT | Performed by: PHYSICIAN ASSISTANT

## 2024-02-06 PROCEDURE — 120N000001 HC R&B MED SURG/OB

## 2024-02-06 RX ORDER — LIDOCAINE HYDROCHLORIDE 20 MG/ML
JELLY TOPICAL
Status: COMPLETED
Start: 2024-02-06 | End: 2024-02-06

## 2024-02-06 RX ORDER — QUETIAPINE FUMARATE 25 MG/1
25 TABLET, FILM COATED ORAL EVERY 6 HOURS PRN
Status: DISCONTINUED | OUTPATIENT
Start: 2024-02-06 | End: 2024-02-06

## 2024-02-06 RX ORDER — QUETIAPINE FUMARATE 25 MG/1
25 TABLET, FILM COATED ORAL ONCE
Status: DISCONTINUED | OUTPATIENT
Start: 2024-02-06 | End: 2024-02-06

## 2024-02-06 RX ORDER — HALOPERIDOL 0.5 MG/1
0.5 TABLET ORAL EVERY 8 HOURS PRN
Status: DISCONTINUED | OUTPATIENT
Start: 2024-02-06 | End: 2024-02-06

## 2024-02-06 RX ADMIN — HYDROMORPHONE HYDROCHLORIDE 2 MG: 2 TABLET ORAL at 22:01

## 2024-02-06 RX ADMIN — QUETIAPINE 12.5 MG: 25 TABLET, FILM COATED ORAL at 20:07

## 2024-02-06 RX ADMIN — METHOCARBAMOL 500 MG: 500 TABLET ORAL at 22:01

## 2024-02-06 RX ADMIN — OLANZAPINE 2.5 MG: 5 TABLET, ORALLY DISINTEGRATING ORAL at 12:00

## 2024-02-06 RX ADMIN — ACETAMINOPHEN 975 MG: 325 TABLET, FILM COATED ORAL at 18:02

## 2024-02-06 RX ADMIN — ACETAMINOPHEN 975 MG: 325 TABLET, FILM COATED ORAL at 01:07

## 2024-02-06 RX ADMIN — ACETAMINOPHEN 975 MG: 325 TABLET, FILM COATED ORAL at 23:51

## 2024-02-06 RX ADMIN — OLANZAPINE 2.5 MG: 5 TABLET, ORALLY DISINTEGRATING ORAL at 20:07

## 2024-02-06 RX ADMIN — ASPIRIN 81 MG: 81 TABLET, COATED ORAL at 08:46

## 2024-02-06 RX ADMIN — MELATONIN 5 MG TABLET 5 MG: at 20:07

## 2024-02-06 RX ADMIN — ACETAMINOPHEN 975 MG: 325 TABLET, FILM COATED ORAL at 08:46

## 2024-02-06 RX ADMIN — SENNOSIDES AND DOCUSATE SODIUM 1 TABLET: 50; 8.6 TABLET ORAL at 08:46

## 2024-02-06 RX ADMIN — SENNOSIDES AND DOCUSATE SODIUM 1 TABLET: 50; 8.6 TABLET ORAL at 20:07

## 2024-02-06 RX ADMIN — LIDOCAINE HYDROCHLORIDE: 20 JELLY TOPICAL at 21:24

## 2024-02-06 RX ADMIN — ASPIRIN 81 MG: 81 TABLET, COATED ORAL at 20:07

## 2024-02-06 ASSESSMENT — ACTIVITIES OF DAILY LIVING (ADL)
ADLS_ACUITY_SCORE: 30
ADLS_ACUITY_SCORE: 28
ADLS_ACUITY_SCORE: 28
ADLS_ACUITY_SCORE: 30
ADLS_ACUITY_SCORE: 28
ADLS_ACUITY_SCORE: 30
ADLS_ACUITY_SCORE: 29
ADLS_ACUITY_SCORE: 29

## 2024-02-06 NOTE — PROVIDER NOTIFICATION
MD Notification    Notified Person: MD    Notified Person Name: Claudio Vidal     Notification Date/Time: 2/6/24, 0215 am     Notification Interaction: Phone     Purpose of Notification: PRN med for agitation     Orders Received: PRN 0.5 haldol Q8hrs.     Comments:

## 2024-02-06 NOTE — CONSULTS
[facility]    Hospitalist Consultation    Date of Admission:  2/5/2024    Date of Consult (When I saw the patient): 02/06/24    Assessment & Plan   Darren Atkinson is a 78 year old male with hx of dementia, right hip fracture (9/2023), prior traumatic SAH/SDH due to fall, and hospital acquired delirium who was admitted to the Orthopedic Surgery service on 2/5/2024 for planned complex right ERROL. Hospitalist service was consulted for post-op delirium.    Post-operative delirium  Dementia with behavioral disturbance  *Lives independently at [facility].   *Has prior history of inpatient delirium, responsive to Seroquel.   *This admission, he developed increased confusion and agitation post-op  - Start melatonin 5 mg qhs  - Seroquel 12.5-25 mg q6h prn and Zyprexa 2.5 mg q6h prn for breakthrough agitation  - Delirium precautions ordered. Minimize opioids; reorient frequently; maintain normal sleep/wake cycles  Addendum: Per discussion with bedside RN, patient is actually largely at his baseline mental status, but family is concerned about home safety and ability to maintain appropriate hip precautions with his baseline memory deficits. Family requesting TCU placement. Will keep delirium orders in place given intermittent agitation and impulsiveness    Right hip fracture s/p complex right ERROL (2/5/2024)  *Sustained right hip fracture 09/2024 following a fall   *Underwent complex right ERROL on 2/5 per Dr. Trinh with no immediate complications.  mL.  - Post-op cares as per Ortho: WBAT, posterior hip precautions  - Pain managed with APAP 975 mg TID; minimize opioids  - PT/OT    Acute blood loss anemia due to surgery  *Hgb 11.1 from 14.5 prior to surgery    FEN: Regular diet  DVT Prophylaxis: Defer to primary service  Code Status: No CPR- Do NOT Intubate    Disposition: Expected discharge pending improvement in mental status and PT clearance, ?Brii Peterson MD    Reason  for Consult   Reason for consult: confusion/agitatio.    Primary Care Physician   Physician No Ref-Primary    Chief Complaint   Right hip fracture s/p complex right ERROL    History is obtained from the patient and review of medical records    History of Present Illness   Darren Atkinson is a 78 year old male with hx of right hip fracture (9/2023), prior traumatic SAH/SDH due to fall, and hospital acquired delirium who was admitted to the Orthopedic Surgery service on 2/5/2024 for planned complex right ERROL. He underwent surgery per Dr. Trinh without immediate complications;  mL. However, since surgery, he has become more confused with intermittent agitation. Hospitalist was subsequently consulted.    Patient is alert and generally confused, rambling somewhat incoherently. He endorses pain, but seems largely comfortable.  He does not have apparent neurological deficits. No s/sx of infection. I tried calling his daughter, María Elena, but was unable to reach her. Per chart review, it appears that he has had delirium during previous hospitalizations. Seems to respond well to Seroquel.     Past Medical History    I have reviewed this patient's medical history and updated the medical record  Past Medical History:   Diagnosis Date    Elevated prostate specific antigen (PSA)     Hip fracture (H) 2023    Memory loss     Subarachnoid hematoma (H) 12/2021       Past Surgical History   I have reviewed this patient's surgical history and updated the medical record  Past Surgical History:   Procedure Laterality Date    ARTHROPLASTY HIP Right 2/5/2024    Procedure: complex right total hip arthroplasty - posterior approach;  Surgeon: Matt Trinh MD;  Location: [facility] OR    BACK SURGERY         Prior to Admission Medications   Prior to Admission Medications   Prescriptions Last Dose Informant Patient Reported? Taking?   Dextromethorphan-guaiFENesin  MG/5ML syrup Unknown at prn long term Yes Yes   Sig: Take 10  mLs by mouth every 4 hours as needed for cough   acetaminophen (TYLENOL) 325 MG tablet Unknown at prn group home Yes Yes   Sig: Take 2 tablets by mouth every 6 hours as needed for mild pain   aspirin 81 MG EC tablet 2/4/2024 at am Nursing Home Yes Yes   Sig: Take 81 mg by mouth daily   meclizine (ANTIVERT) 25 MG tablet Unknown at prn group home Yes Yes   Sig: Take 1 tablet by mouth 3 times daily as needed for dizziness   polyethylene glycol (MIRALAX) 17 g packet Unknown at prn group home Yes Yes   Sig: Take 1 packet by mouth as needed for constipation   sennosides (SENOKOT) 8.6 MG tablet Unknown at prn group home Yes Yes   Sig: Take 1 tablet by mouth daily as needed for constipation      Facility-Administered Medications: None     Allergies   Allergies   Allergen Reactions    Morphine Hallucination    Oxycodone Hallucination       Social History   I have reviewed this patient's social history. Quit smoking in 2019. Hx of alcohol use disorder - in remission. Lives independently at [facility]    Family History   I have reviewed this patient's family history  History reviewed. No pertinent family history.    Review of Systems   The 10 point Review of Systems is negative other than noted in the HPI    Physical Exam   Temp: 98.1  F (36.7  C) Temp src: Oral BP: 111/81 Pulse: 77   Resp: 18 SpO2: 99 % O2 Device: None (Room air) Oxygen Delivery: 6 LPM  Vital Signs with Ranges  Temp:  [97.4  F (36.3  C)-98.1  F (36.7  C)] 98.1  F (36.7  C)  Pulse:  [] 77  Resp:  [0-20] 18  BP: ()/(32-81) 111/81  SpO2:  [91 %-100 %] 99 %  159 lbs 0 oz    Constitutional: Sitting up in chair, NAD  HEENT: NC/AT, sclera white, conjunctiva clear, EOMI, MMM  Respiratory: Breathing non-labored. Lungs CTAB - no wheezes/crackles/rhonchi  Cardiovascular: Heart RRR, no m/r/g. No pedal edema.   GI: +BS. Abd soft/NT  Lymph/Hematologic: No cervical LAD  Genitourinary: Not examined  Skin: Warm and dry. No rash.  Musculoskeletal:  Normal muscle bulk and tone  Neurologic: Alert and conversive, but generally confused. KWON. Does not follow commands inconsistently.  Psychiatric: Somewhat restless, but cooperative and redirectable    Data   -Data reviewed today: All pertinent laboratory and imaging results from this encounter were reviewed. I personally reviewed no images or EKG's today.  Recent Labs   Lab 02/06/24  0751 01/30/24  0950   WBC  --  6.4   HGB 11.1* 14.5   MCV  --  94   PLT  --  240   NA  --  140   POTASSIUM  --  4.4   CHLORIDE  --  104   CO2  --  27   BUN  --  13.7   CR  --  0.81   ANIONGAP  --  9   ROMAIN  --  10.0   GLC 93 81   ALBUMIN  --  4.0   PROTTOTAL  --  7.4   BILITOTAL  --  0.5   ALKPHOS  --  140   ALT  --  20   AST  --  28       Recent Results (from the past 24 hour(s))   XR Pelvis w Hip Port Right 1 View    Narrative    XR PELVIS AND HIP PORTABLE RIGHT 1 VIEW   2/5/2024 1:57 PM     HISTORY: Status post Hip surgery  COMPARISON: None.       Impression    IMPRESSION: There are immediate postoperative changes from right total  hip arthroplasty with a longstem femoral component. No periprosthetic  fracture. There is some heterotopic ossification adjacent to the  proximal right femur. There is mild left hip degenerative arthrosis.  There are also degenerative changes in the lower lumbar spine.    KARLO BRASWELL MD         SYSTEM ID:  [id number]

## 2024-02-06 NOTE — CONSULTS
Care Management Initial Consult    General Information  Assessment completed with: VM-chart review, Children, daughter María Elena  Type of CM/SW Visit: CM Role Introduction    Primary Care Provider verified and updated as needed: Yes   Readmission within the last 30 days: no previous admission in last 30 days      Reason for Consult: discharge planning  Advance Care Planning:            Communication Assessment  Patient's communication style: spoken language (English or Bilingual)    Hearing Difficulty or Deaf: no   Wear Glasses or Blind: yes    Cognitive  Cognitive/Neuro/Behavioral: .WDL except  Level of Consciousness: confused  Arousal Level: opens eyes spontaneously  Orientation: disoriented to, place, time, situation  Mood/Behavior: agitated, angry, anxious, restless, threatening  Best Language: 0 - No aphasia  Speech: illogical, clear    Living Environment:   People in home: alone     Current living Arrangements: apartment, extended care facility  Name of Facility: [facility]   Able to return to prior arrangements: yes       Family/Social Support:  Care provided by: self, child(yessica)  Provides care for: no one  Marital Status:   Children          Description of Support System: Supportive, Involved         Current Resources:   Patient receiving home care services:       Community Resources:    Equipment currently used at home: walker, rolling, wheelchair, manual (Mainly uses wheelchair due to pain before surgery)  Supplies currently used at home:      Employment/Financial:  Employment Status: retired        Financial Concerns:             Does the patient's insurance plan have a 3 day qualifying hospital stay waiver?  No    Lifestyle & Psychosocial Needs:  Social Determinants of Health     Food Insecurity: Low Risk  (1/30/2024)    Food Insecurity     Within the past 12 months, did you worry that your food would run out before you got money to buy more?: No     Within the past 12 months, did  the food you bought just not last and you didn t have money to get more?: No   Depression: Not at risk (1/30/2024)    PHQ-2     PHQ-2 Score: 2   Housing Stability: Low Risk  (1/30/2024)    Housing Stability     Do you have housing? : Yes     Are you worried about losing your housing?: No   Tobacco Use: Medium Risk (2/5/2024)    Patient History     Smoking Tobacco Use: Former     Smokeless Tobacco Use: Never     Passive Exposure: Not on file   Financial Resource Strain: Low Risk  (1/30/2024)    Financial Resource Strain     Within the past 12 months, have you or your family members you live with been unable to get utilities (heat, electricity) when it was really needed?: No   Alcohol Use: Not on file   Transportation Needs: Low Risk  (1/30/2024)    Transportation Needs     Within the past 12 months, has lack of transportation kept you from medical appointments, getting your medicines, non-medical meetings or appointments, work, or from getting things that you need?: No   Physical Activity: Not on file   Interpersonal Safety: Not on file   Stress: Not on file   Social Connections: Not on file       Functional Status:  Prior to admission patient needed assistance:              Mental Health Status:          Chemical Dependency Status:                Values/Beliefs:  Spiritual, Cultural Beliefs, Taoism Practices, Values that affect care:                 Additional Information:  Consult for discharge planning. Patient admitted for osteoarthritis of right hip, s/p arthroplasty on 2/5/24 with tentative discharge date of 2/7/24. Writer reviewed chart and TCU recommendations at discharge if unable to have 24/7 supervision at [facility]. Patient lives at [facility] in their memory care. Prior to admission he was on no medication, did his own cleaning and own self care. Meals were delivered but that was the only assist he received.     Writer met with María Elena, patient's daughter to complete consult via  phone and introduced self and role. Patient in agreement for TCU as per María Elena, he will not have 24/7 supervision at discharge. María Elena is patient's main support and she will be leaving the country Friday. She has another sister but María Elena said that she is not available for 24/7 assist. In the past patient has been to [facility] and then was moved to [place]. Writer discussed Medicare.gov and suggested that María Elena review that and provide 3-5 choices to send referrals to.. Writer provided direct contact information for María Elena to call back and provide those options.      Transportation was not provided at this time. Patient is listed as an inpatient today and not [facility]. Patient will require 3 nights to qualify for TCU.     1215: María Elena called back and provided choices in order:  [facility]  [facility]  [facility]    Referrals sent and daughter updated. María Elena also updated that patient's status today is inpatient so that will be a factor for covered vs private pay. She appreciated the awareness.    JOSE ROBERTO Green

## 2024-02-06 NOTE — CONSULTS
[facility] Nurse Inpatient Assessment     Consulted for: Right buttock and scar over left outer hip bone    Summary: OR 2/5/24 for Right total hip arthroplasty    Patient History (according to provider note(s):      Darren Atkinson is a 78 year old male with hx of dementia, right hip fracture (9/2023), prior traumatic SAH/SDH due to fall, and hospital acquired delirium who was admitted to the Orthopedic Surgery service on 2/5/2024 for planned complex right ERROL. Hospitalist service was consulted for post-op delirium.     Post-operative delirium  Dementia with behavioral disturbance  *Lives independently at [facility].   *Has prior history of inpatient delirium, responsive to Seroquel.   *This admission, he developed increased confusion and agitation post-op  - Start melatonin 5 mg qhs  - Seroquel 12.5-25 mg q6h prn and Zyprexa 2.5 mg q6h prn for breakthrough agitation  - Delirium precautions ordered. Minimize opioids; reorient frequently; maintain normal sleep/wake cycles  Addendum: Per discussion with bedside RN, patient is actually largely at his baseline mental status, but family is concerned about home safety and ability to maintain appropriate hip precautions with his baseline memory deficits. Family requesting TCU placement. Will keep delirium orders in place given intermittent agitation and impulsiveness     Right hip fracture s/p complex right ERROL (2/5/2024)  *Sustained right hip fracture 09/2024 following a fall   *Underwent complex right ERROL on 2/5 per Dr. Trinh with no immediate complications.  mL.  - Post-op cares as per Ortho: WBAT, posterior hip precautions  - PT/OT     Acute blood loss anemia due to surgery      Assessment:      Areas visualized during today's visit: Focused: and right buttock    Wound location: right buttock    Wound due to: Friction and Shear, POA  Wound history/plan of care: [place] of redness to flashy part of right buttock with small  "friction abrasion, the area is mostly healed and blanchable. Daughter in room, she is a Nurse and takes care of his buttock wound at home. She reports old injury to right buttock from not using a cushion in the wheelchair, they now have a cushion to use. They apply Narayan ointment daily at night but did not bring it with them to the hospital. They are ok with using Sween 24 cream while hospitalized.   Wound base: 100 % blanchable , erythema, and epidermis,      Palpation of the wound bed: normal      Drainage: none     Description of drainage: none     Measurements (length x width x depth, in cm): open area to right buttock: 0.1 x 0.1x 0cm       Tunneling: N/A     Undermining: N/A  Periwound skin: Intact      Color: normal and consistent with surrounding tissue      Temperature: normal   Odor: none  Pain: denies , none  Pain interventions prior to dressing change: slow and gentle cares   Treatment goal: Heal , Infection control/prevention, Maintain (prevention of deterioration), and Protection  STATUS: initial assessment  Supplies ordered: gathered, supplies stored on unit, and discussed with patient         Scar tissue present to left hip, scratch marks form patient itching the area. Not open at time of assessment.  Treatment Plan:     Right buttock wound(s): Daily  and PRN for episodes of incontinence  Cleanse right buttock with microcleanse, pat dry.  Apply Sween 24 dimethicone cream  May leave open to air     Pressure Injury Prevention (PIP) Plan:  If patient is declining pressure injury prevention interventions: Explore reason why and address patient's concerns, Educate on pressure injury risk and prevention intervention(s), If patient is still declining, document \"informed refusal\" , and Ensure Care team is aware ( provider, charge nurse, etc)  Mattress: Follow bed algorithm, reassess daily and order specialty mattress, if indicated.  HOB: Maintain at or below 30 degrees, unless contraindicated  Repositioning " in bed: Every 1-2 hours , Left/right positioning; avoid supine, Raise foot of bed prior to raising head of bed, to reduce patient sliding down (shear), and Frequent microturns using TAPS wedges, as patient tolerates  Heels: Keep elevated off mattress, Pillows under calves, and Heel lift boots  Protective Dressing: Sacral Mepilex for prevention (#[id number]),  especially for the agitated patient   Positioning Equipment: None  Chair positioning: Chair cushion (#[id number]) , Repositions self: patient to shift weight every 15 minutes, Assist patient to reposition hourly, and Do NOT use a donut for sitting (this increases pressure to smaller area and creates a higher potential for injury)    If patient has a buttock pressure injury, or high risk for PI use chair cushion or SPS.  Moisture Management: Perineal cleansing /protection: Follow Incontinence Protocol, Avoid brief in bed, Clean and dry skin folds with bathing , Consider InterDry (#462494) between folds, and Moisturize dry skin  Under Devices: Inspect skin under all medical devices during skin inspection , Ensure tubes are stabilized without tension, and Ensure patient is not lying on medical devices or equipment when repositioned  Ask provider to discontinue device when no longer needed.      Orders: Written    RECOMMEND PRIMARY TEAM ORDER: None, at this time  Education provided: importance of repositioning, plan of care, wound progress, Infection prevention , Moisture management, Hygiene, and Off-loading pressure  Discussed plan of care with: Patient and Family  WOC nurse follow-up plan: weekly  Notify WOC if wound(s) deteriorate.  Nursing to notify the Provider(s) and re-consult the WOC Nurse if new skin concern.    DATA:     Current support surface: Standard  Standard gel/foam mattress (IsoFlex, Atmos air, etc)  Containment of urine/stool: Incontinence Protocol and Incontinent pad in bed  BMI: Body mass index is 24.18 kg/m .   Active diet order: Orders Placed This  Encounter      Advance Diet as Tolerated: Regular Diet Adult      Discharge Instruction - Regular Diet Adult     Output: I/O last 3 completed shifts:  In: 350 [P.O.:350]  Out: 1300 [Urine:1300]     Labs:   Recent Labs   Lab 02/06/24  0751   HGB 11.1*     Pressure injury risk assessment:   Sensory Perception: 4-->no impairment  Moisture: 4-->rarely moist  Activity: 3-->walks occasionally  Mobility: 3-->slightly limited  Nutrition: 3-->adequate  Friction and Shear: 3-->no apparent problem  Jefferson Score: 20    Yu Edmond, RN, BSN, CWON   Pager no longer is use, please contact through [employer] group: [facility] Nurse Teresita  Dept. Office Number: [phone]

## 2024-02-06 NOTE — PLAN OF CARE
Goal Outcome Evaluation:  Trauma/Ortho/Medical (Choose one)  ortho  Diagnosis: right posterior ERROL  POD#: 1  Mental Status: A/Ox1  Activity/dangle one assist/GB/walker  Diet: regular  Pain: scheduled Tylenol  Vera/Voiding: bathroom  02/LDA: RA/no IV access  D/C Date: pending TCU placement  Other Info: WOC consulted for right buttock wound

## 2024-02-06 NOTE — PROGRESS NOTES
ORTHO PROGRESS NOTE      Procedure(s):  complex right total hip arthroplasty - posterior approach   -1 Day Post-Op      Agitated.  Does not know where he is    Vital Signs with Ranges  Temp:  [96.8  F (36  C)-98.1  F (36.7  C)] 98.1  F (36.7  C)  Pulse:  [] (P) 79  Resp:  [0-20] 18  BP: ()/(32-75) 105/65  SpO2:  [91 %-100 %] 97 %  I/O last 3 completed shifts:  In: 1250 [P.O.:150; I.V.:1100]  Out: 1400 [Urine:900; Blood:500]  Recent Labs   Lab 01/30/24  0950   HGB 14.5       Dressing clean, dry and intact.  Unable to follow commands for exam      Plan:  -WBAT  - Posterior hip precautions  -Progress Physical Therapy as able.  -Continue pain control measures    -Discharge pending mental status     Sarahi Cano PA-C

## 2024-02-06 NOTE — PROGRESS NOTES
Occupational Therapy: Orders received. Chart reviewed and discussed with care team.? Occupational Therapy not indicated due to pt with baseline cog impairment, lives in memory care, not demonstrating carryover with PT. Will need assist for all ADL and mobility to maintain posterior precautions, see PT note for details and recommendations. No skilled OT needs for discharge as needs already determined and pt OBS status. . Defer discharge recommendations to care team.? Will complete orders.

## 2024-02-06 NOTE — PROGRESS NOTES
"   02/05/24 1600   Appointment Info   Signing Clinician's Name / Credentials (PT) Kathya Loyd DPT   Rehab Comments (PT) No hip flex >90 deg, no hip IR, no adduction past midline, WBAT R LE   Living Environment   People in Home facility resident   Current Living Arrangements assisted living   Home Accessibility no concerns   Transportation Anticipated family or friend will provide   Living Environment Comments Pt lives in a memory care apartment per daughter report, states that the pt typically does not receive any assist at the [facility] but has a call light if needed. Daughter reports that pt would not be alone upon discharge but would like to speak to SW/CC regarding discharge planning.   Self-Care   Usual Activity Tolerance moderate   Current Activity Tolerance fair   Regular Exercise No   Equipment Currently Used at Home walker, rolling;wheelchair, manual  (Mainly uses wheelchair due to pain before surgery)   Fall history within last six months yes   Number of times patient has fallen within last six months 2   Activity/Exercise/Self-Care Comment Pt is typically Mod I with all cares and ADL's at baseline, they bring his meals to him at this time. Per daughter report, pt primarily uses a manual w/c due to pain in R hip but also ambulates w/ FWW at baseline.   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   Existing Precautions/Restrictions fall;weight bearing;no hip IR;no hip ADD past midline;90 degree hip flexion   Weight-Bearing Status - LUE full weight-bearing "   Weight-Bearing Status - RUE full weight-bearing   Weight-Bearing Status - LLE full weight-bearing   Weight-Bearing Status - RLE weight-bearing as tolerated   Cognition   Affect/Mental Status (Cognition) anxious;confused   Orientation Status (Cognition) oriented to;person   Follows Commands (Cognition) follows one-step commands;increased processing time needed;repetition of directions required;verbal cues/prompting required   Cognitive Status Comments Pt pleasant but requires redirection throughout session to task at hand, pt perseverating on length of R LE prior to surgery throguhout session.   Pain Assessment   Patient Currently in Pain No   Integumentary/Edema   Integumentary/Edema no deficits were identifed   Posture    Posture Forward head position;Protracted shoulders   Range of Motion (ROM)   Range of Motion ROM deficits secondary to swelling;ROM deficits secondary to pain;ROM deficits secondary to surgical procedure;ROM deficits secondary to weakness   Strength (Manual Muscle Testing)   Strength (Manual Muscle Testing) Deficits observed during functional mobility   Bed Mobility   Comment, (Bed Mobility) Supine>sitting EOB completed w/ CGA   Transfers   Comment, (Transfers) Sit>stand completed w/ CGA   Gait/Stairs (Locomotion)   Comment, (Gait/Stairs) Pt ambulated w/ FWW and CGA   Balance   Balance other (describe)   Balance Comments Pt demonstrated good sitting balance, required FWW for all standing and dynamic balance   Sensory Examination   Sensory Perception patient reports no sensory changes   Coordination   Coordination no deficits were identified   Muscle Tone   Muscle Tone no deficits were identified   Clinical Impression   Criteria for Skilled Therapeutic Intervention Yes, treatment indicated   PT Diagnosis (PT) Impaired functional mobility   Influenced by the following impairments Post-surgical pain and precautions, weakness, impaired activity tolerance, impaired balance\   Functional limitations  due to impairments Impaired gait and inability to complete ADL's   Clinical Presentation (PT Evaluation Complexity) stable   Clinical Presentation Rationale Clinical judgment   Clinical Decision Making (Complexity) low complexity   Planned Therapy Interventions (PT) balance training;bed mobility training;gait training;home exercise program;motor coordination training;neuromuscular re-education;patient/family education;postural re-education;ROM (range of motion);stair training;strengthening;stretching;transfer training;progressive activity/exercise;risk factor education;home program guidelines   Risk & Benefits of therapy have been explained evaluation/treatment results reviewed;care plan/treatment goals reviewed;risks/benefits reviewed;current/potential barriers reviewed;participants voiced agreement with care plan;participants included;patient;daughter   PT Total Evaluation Time   PT Eval, Low Complexity Minutes (02730) 10   Physical Therapy Goals   PT Frequency Daily   PT Predicted Duration/Target Date for Goal Attainment 02/12/24   PT Goals Bed Mobility;Transfers;Gait   PT: Bed Mobility Independent;Rolling;Supine to/from sit;Bridging;Within precautions   PT: Transfers Modified independent;Sit to/from stand;Bed to/from chair;Assistive device;Within precautions   PT: Gait Modified independent;Rolling walker;Within precautions;Greater than 200 feet   Interventions   Interventions Quick Adds Gait Training;Therapeutic Activity;Therapeutic Procedure   Therapeutic Procedure/Exercise   Ther. Procedure: strength, endurance, ROM, flexibillity Minutes (25075) 5   Treatment Detail/Skilled Intervention Pt instructed in completing ankle pumps and quad sets in long sitting x 5 reps in order to promote full knee extension needed during gait and improve circulation.   Therapeutic Activity   Therapeutic Activities: dynamic activities to improve functional performance Minutes (06964) 20   Treatment Detail/Skilled Intervention  Evaluation completed and treatment indicated. Pt and daughter educated on hip precautions including, no hip flex > 90 deg, no hip IR, no adduction past midline, and WBAT. Pt required multiple redirection cues throughout session due to distractability and perseveration on lines. Supine>sitting EOB completed w/ CGA, pt cued for technique to maintain precautions. Sit>stand completed x 3 reps w/ FWW in front, pt cued to push up from bed rather than FWW when standing. Pt required increased encouragment for ambulation, concerned regarding length of R LE due to it being shorter than L LE prior to surgery. Post-ambulation, pt and daughter educated on walking program, discharge planning, and pain managment. Pt left sitting up in chair with all needs in reach and RN present in room.   Gait Training   Gait Training Minutes (38451) 25   Symptoms Noted During/After Treatment (Gait Training) fatigue   Treatment Detail/Skilled Intervention Pt ambulated ~150 ft w/ FWW and CGA. Pt initially demonstrated vaulting gait on R LE in order to clear L LE. Pt cued to stop and complete pre-gait weight shifting side to side while extending R knee fully x 10 reps. Pt then cued during gait for heel-toe gait and increased step length to promote knee extension and neutral hip alignment while ambulating. Pt demonstrated improvement following repetition of cues, reduced vaulting noted throughout session. No overt LOB occurred.   Distance in Feet ~150 ft   [place] Level (Gait Training) contact guard   Physical Assistance Level (Gait Training) 1 person assist   Weight Bearing (Gait Training) weight-bearing as tolerated   Assistive Device (Gait Training) rolling walker   PT Discharge Planning   PT Plan Progress gait distance w/ FWW and focus on heel-toe gait on R LE to promote full knee extension, LE exercises, review precautions   PT Discharge Recommendation (DC Rec)   (Defer to Ortho)   PT Rationale for DC Rec Pt is moving below baseline  mobility, currently limited by post-surgical precautions, weakness, and impaired balance due to previous shortening of R LE. Anticipate with further IP skilled PT to address deficits in gait that pt will be safe to return back to his memory care but will need 24/7 assistance for safe mobility at this time. Recommend OP PT in order to further address gait abnormalities and weakness. If pt unable to have 24/7 assistance, would need TCU prior to discharge back to [facility] apartment.   PT Brief overview of current status Close CGA for all functional mobility   PT Equipment Needed at Discharge walker, rolling  (Pt has FWW at home already)   Total Session Time   Timed Code Treatment Minutes 50   Total Session Time (sum of timed and untimed services) 60     [facility]  OUTPATIENT PHYSICAL THERAPY EVALUATION  PLAN OF TREATMENT FOR OUTPATIENT REHABILITATION  (COMPLETE FOR INITIAL CLAIMS ONLY)  Patient's Last Name, First Name, M.I.  YOB: 1945  AdanonDarren  HERNANDEZ                        Provider's Name  [facility] Medical Record No.  [id number]                             Onset Date:  02/05/24   Start of Care Date:      Type:     _X_PT   ___OT   ___SLP Medical Diagnosis:                 PT Diagnosis:  Impaired functional mobility Visits from SOC:  1     See note for plan of treatment, functional goals and certification details    I CERTIFY THE NEED FOR THESE SERVICES FURNISHED UNDER        THIS PLAN OF TREATMENT AND WHILE UNDER MY CARE     (Physician co-signature of this document indicates review and certification of the therapy plan).

## 2024-02-06 NOTE — PROGRESS NOTES
Diagnosis: R total hip arthoplasty   Mental Status: A&O x2; disorientated to time & situation   Activity/dangle: Assist x1 w/ gb and walker   Diet: Tolerating regular   Vera/Voiding: BR  Pain: Denies   Tele/Restraints/Iso: n/a   02/LDA: RA, No IV access  D/C Date: TBD   Other Info: Dressing CDI. Wound on right buttocks- meplix in place. Straight cath- 800 mL.

## 2024-02-07 ENCOUNTER — APPOINTMENT (OUTPATIENT)
Dept: PHYSICAL THERAPY | Facility: CLINIC | Age: 79
DRG: 522 | End: 2024-02-07
Attending: ORTHOPAEDIC SURGERY
Payer: MEDICARE

## 2024-02-07 LAB
GLUCOSE SERPL-MCNC: 107 MG/DL (ref 70–99)
HGB BLD-MCNC: 10.2 G/DL (ref 13.3–17.7)

## 2024-02-07 PROCEDURE — 250N000013 HC RX MED GY IP 250 OP 250 PS 637: Performed by: PHYSICIAN ASSISTANT

## 2024-02-07 PROCEDURE — 85018 HEMOGLOBIN: CPT | Performed by: PHYSICIAN ASSISTANT

## 2024-02-07 PROCEDURE — 97116 GAIT TRAINING THERAPY: CPT | Mod: GP | Performed by: PHYSICAL THERAPIST

## 2024-02-07 PROCEDURE — 97530 THERAPEUTIC ACTIVITIES: CPT | Mod: GP | Performed by: PHYSICAL THERAPIST

## 2024-02-07 PROCEDURE — 120N000001 HC R&B MED SURG/OB

## 2024-02-07 PROCEDURE — 82947 ASSAY GLUCOSE BLOOD QUANT: CPT | Performed by: ORTHOPAEDIC SURGERY

## 2024-02-07 PROCEDURE — 250N000013 HC RX MED GY IP 250 OP 250 PS 637: Performed by: INTERNAL MEDICINE

## 2024-02-07 PROCEDURE — 36415 COLL VENOUS BLD VENIPUNCTURE: CPT | Performed by: ORTHOPAEDIC SURGERY

## 2024-02-07 PROCEDURE — 99207 PR NO BILLABLE SERVICE THIS VISIT: CPT | Performed by: INTERNAL MEDICINE

## 2024-02-07 RX ADMIN — ASPIRIN 81 MG: 81 TABLET, COATED ORAL at 08:09

## 2024-02-07 RX ADMIN — MELATONIN 5 MG TABLET 5 MG: at 20:14

## 2024-02-07 RX ADMIN — ACETAMINOPHEN 975 MG: 325 TABLET, FILM COATED ORAL at 17:44

## 2024-02-07 RX ADMIN — QUETIAPINE 25 MG: 25 TABLET, FILM COATED ORAL at 02:01

## 2024-02-07 RX ADMIN — ACETAMINOPHEN 975 MG: 325 TABLET, FILM COATED ORAL at 08:09

## 2024-02-07 RX ADMIN — SENNOSIDES AND DOCUSATE SODIUM 1 TABLET: 50; 8.6 TABLET ORAL at 20:13

## 2024-02-07 RX ADMIN — POLYETHYLENE GLYCOL 3350 17 G: 17 POWDER, FOR SOLUTION ORAL at 08:09

## 2024-02-07 RX ADMIN — QUETIAPINE 25 MG: 25 TABLET, FILM COATED ORAL at 11:08

## 2024-02-07 RX ADMIN — SENNOSIDES AND DOCUSATE SODIUM 1 TABLET: 50; 8.6 TABLET ORAL at 08:09

## 2024-02-07 RX ADMIN — ASPIRIN 81 MG: 81 TABLET, COATED ORAL at 20:14

## 2024-02-07 ASSESSMENT — ACTIVITIES OF DAILY LIVING (ADL)
ADLS_ACUITY_SCORE: 32
ADLS_ACUITY_SCORE: 31
ADLS_ACUITY_SCORE: 34
ADLS_ACUITY_SCORE: 31
ADLS_ACUITY_SCORE: 30
ADLS_ACUITY_SCORE: 34
ADLS_ACUITY_SCORE: 31
ADLS_ACUITY_SCORE: 32
ADLS_ACUITY_SCORE: 34
ADLS_ACUITY_SCORE: 32
ADLS_ACUITY_SCORE: 31
ADLS_ACUITY_SCORE: 31

## 2024-02-07 NOTE — PLAN OF CARE
Goal Outcome Evaluation:    24 5424-0875  Trauma/Ortho/Medical (Choose one)  ortho  Diagnosis: Right Total hip Arthroplasty  POD#: 2  Mental Status: A/O to self, at best x3.  Activity/dangle: Ax1 gb/w. Pt does sit in this w/c and wheel himself around. Can be impulsive, long arm.   Diet: regular  Pain: scheduled Tylenol  Vera/Voidin mL this am.   : RA, no IV access  D/C Date: Pt will be returning back to memory care. Daughter María Elena will  pt  10 am  Other Info: WOC care for right butt wound.

## 2024-02-07 NOTE — PLAN OF CARE
Goal Outcome Evaluation:    Date/Time: 2/6/24 (5513-5496)    Diagnosis: R ERROL posterior   POD#: 2  Mental Status: confused. A&O to self only  Activity/dangle: A1 GB/Wk  Diet: reg  Pain: PO dilaudid x1 & scheduled tylenol   Vera/Voiding: DTV. Straight cathed x1  Tele/Restraints/Iso: NA  02/LDA: RA/No IV access  D/C Date: pending placement   Other Info: restless with cares. Zyprexa x1 & seroquel x2 at .

## 2024-02-07 NOTE — PROGRESS NOTES
Hospitalist note    Met with patient and his daughter at bedside. Patient seems to be at his baseline status with plan to discharge home if cleared by PT later today    No additional recommendations from our service. Discharge med rec completed.    MARV Peterson MD  Hospitalist  [phone]

## 2024-02-07 NOTE — PROGRESS NOTES
"Care Management Follow Up    Length of Stay (days): 1  Expected Discharge Date: 02/07/2024  Concerns to be Addressed:     discharge planning   Patient plan of care discussed at interdisciplinary rounds: Yes  Anticipated Discharge Disposition: return to [facility]   Anticipated Discharge Services: None  Anticipated Discharge DME: None  Patient/family educated on Medicare website which has current facility and service quality ratings: yes  Education Provided on the Discharge Plan:  yes  Patient/Family in Agreement with the Plan: yes  Referrals Placed by CM/SW: Post Acute Facilities  Private pay costs discussed: Not applicable    Additional Information:    NEFTALI-RN called [facility] [phone] spoke with nurse     In what kind of facility does pt reside?  Facility Type: [facility] (assisted living) - \"home discharge\"    Name of building/unit:  Memory care  What do you know about pt's baseline cognition and mobility? Independent stand by   What level of cognition/mobility is required for safe return? 1 person transfer    Is resident enrolled in any services?  all [facility] services; memory care residents get checked on q1h   (ask about: meals, physical assist with ADLs, med administration, housekeeping, and if they have built in or wearable call buttons)  Are skilled home health or outpatient therapy services available there? No  agency, if known: pt choice  Is the resident seen by their PCP on-site or off-site?  On-site [facility]   Any built in grab bars in unit (describe): Yes  Does pt have any personal DME (describe)? No    Best number to reach facility nursing? Phone [facility] [phone]  Fax:  [phone]   Evening/weekend number? After hours cell [phone]   What does the facility need faxed to them from us at discharge?  MD Orders, MAR, H&P, discharge summary, consults, therapy notes  Does facility manage medications?   Yes If yes, contracted pharmacy? [facility]    If new Rx meds at discharge, fill at hospital pharmacy or " "contracted pharmacy?   send directly to [employer] please (bubble packed)   What is the preferred return time for the resident?  \"11am works really well; 2pm latest preferred (pharmacy cutoff time is 1pm)\"     Faxed therapy notes and information  and provided callback contact information via fax.         Katy Dudley RN, BSN, PHN  [facility]  Inpatient Care Management - FLOAT  Ortho CM RN Mobile: [phone] daily 7:30-4:00        "

## 2024-02-07 NOTE — PROGRESS NOTES
2/6/24 [id number]  Trauma/Ortho/Medical (Choose one)  ortho  Diagnosis: Right Total hip Arthroplasty  POD#: 1  Mental Status: A/O to self  Activity/dangle up with assist of 1, Gb and walker  Diet: regular  Pain: scheduled Tylenol  Vera/Voiding:  ml  02/LDA: RA, no IV access  D/C Date: pending TCU placement  Other Info: WOC consulted for right buttock wound and placed order for wound care.

## 2024-02-07 NOTE — PROGRESS NOTES
ORTHO PROGRESS NOTE    Seen at 0800    Procedure(s):  complex right total hip arthroplasty - posterior approach   -2 Days Post-Op    Mentation much better today  AO x3  Does not have pain in his hip  Daughter present, states mentation is back to baseline    Vital Signs with Ranges  Temp:  [97.5  F (36.4  C)-98.6  F (37  C)] 98.6  F (37  C)  Pulse:  [] 109  Resp:  [16-18] 18  BP: ()/(56-65) 101/65  SpO2:  [93 %-99 %] 98 %  I/O last 3 completed shifts:  In: 200 [P.O.:200]  Out: 970 [Urine:970]  Recent Labs   Lab 02/07/24  0737 02/06/24  0751   HGB 10.2* 11.1*     Sitting up in chair  Able to follow commands apropriately  Dressing clean, dry and intact.  Calves soft bilaterally  Swelling appropriate for post operative condition  Able perform a quad contraction bilaterally  Able to dorsiflex at the ankle past neutral.      Plan:  -WBAT, posterior total hip precautions  -Progress Physical Therapy as able.  -Continue pain control measures  - ASA 81 mg BID at discharge for DVT propylaxis  -Discharge to home today with family.  Mobility is back to baseline and should not need formal PT.     Sarahi Cano PA-C

## 2024-02-07 NOTE — PROGRESS NOTES
Called for help with straight catheterization due to resistant on prior trial for urine retention as per void trial standing orders. Used Coude catheter and had an output of 560mls. Patient tolerated the procedure well.  Jass Sam RN on 2/6/2024 at 10:09 PM

## 2024-02-07 NOTE — PROGRESS NOTES
"Care Management Discharge Note    Discharge Date: 02/08/2024 at 9:00 AM transportation via daughter        Discharge Disposition: assisted living    Discharge Services: None    Discharge DME: None    Discharge Transportation: family or friend will provide    Private pay costs discussed: Not applicable    Does the patient's insurance plan have a 3 day qualifying hospital stay waiver?  No    PAS Confirmation Code:  na  Patient/family educated on Medicare website which has current facility and service quality ratings: yes    Education Provided on the Discharge Plan:  yes  Persons Notified of Discharge Plans: daughter, [facility]   Patient/Family in Agreement with the Plan: yes    Handoff Referral Completed: Yes info faxed to [facility]     Additional Information:  [id number] Staff notified CM team pt has discharge orders what is pt discharge plan/ride?    CM team was not aware of discharge orders obtained.  Reviewed orders.   Meds were not Rx'd to pharmacy requested ([facility])  No therapy orders are in place (therapies recommend continue therapies).    Ride was not arranged to have pt arrive by 1400.  Daughter not available until AM.   Confirmed with [facility].  Plan to discharge in AM, updated charge nurse.     Left  for PA as therapies note would benefit from continued therapies;   Then spoke again with daughter who will be here earlier than 10am for discharge.  Daughter states surgeon team told her this AM \"he doesn't need therapies.\"   NEFTALI-RN reviewed chart, noting surgeon team has not filed a note today, anticipate that later today.  Received clarifying call from PA, no therapies needed--pt doing well to return to [facility] in AM.     Katy Dudley RN, BSN, PHN  [facility]  Inpatient Care Management - FLOAT  Ortho CM RN Mobile: [phone] daily 7:30-4:00        "

## 2024-02-08 VITALS
SYSTOLIC BLOOD PRESSURE: 107 MMHG | DIASTOLIC BLOOD PRESSURE: 78 MMHG | RESPIRATION RATE: 16 BRPM | WEIGHT: 159 LBS | OXYGEN SATURATION: 94 % | HEART RATE: 86 BPM | TEMPERATURE: 97.8 F | HEIGHT: 68 IN | BODY MASS INDEX: 24.1 KG/M2

## 2024-02-08 PROCEDURE — 250N000013 HC RX MED GY IP 250 OP 250 PS 637: Performed by: PHYSICIAN ASSISTANT

## 2024-02-08 RX ADMIN — SENNOSIDES AND DOCUSATE SODIUM 1 TABLET: 50; 8.6 TABLET ORAL at 08:18

## 2024-02-08 RX ADMIN — METHOCARBAMOL 500 MG: 500 TABLET ORAL at 00:06

## 2024-02-08 RX ADMIN — ACETAMINOPHEN 975 MG: 325 TABLET, FILM COATED ORAL at 08:18

## 2024-02-08 RX ADMIN — ACETAMINOPHEN 975 MG: 325 TABLET, FILM COATED ORAL at 00:05

## 2024-02-08 RX ADMIN — POLYETHYLENE GLYCOL 3350 17 G: 17 POWDER, FOR SOLUTION ORAL at 08:18

## 2024-02-08 RX ADMIN — ASPIRIN 81 MG: 81 TABLET, COATED ORAL at 08:18

## 2024-02-08 ASSESSMENT — ACTIVITIES OF DAILY LIVING (ADL)
ADLS_ACUITY_SCORE: 34

## 2024-02-08 NOTE — PROGRESS NOTES
(2247-2911)    Pt alert to self, up x1 with walker and GB, impulsive to get up. R hip incision site dressing dry and intact. No pain noted. Will discharge tomorrow morning.

## 2024-02-08 NOTE — PROGRESS NOTES
Care Management Discharge Note    Discharge Date: 02/08/2024       Discharge Disposition: Transitional Care, Skilled Nursing Facility    Discharge Services: None    Discharge DME: None    Discharge Transportation: family or friend will provide    Private pay costs discussed: Not applicable    Does the patient's insurance plan have a 3 day qualifying hospital stay waiver?  No    PAS Confirmation Code:    Patient/family educated on Medicare website which has current facility and service quality ratings: yes    Education Provided on the Discharge Plan:    Persons Notified of Discharge Plans: LVM on facility ([facility]) phone  Patient/Family in Agreement with the Plan: yes    Handoff Referral Completed: No    Additional Information:  Consult completed 2/7.  Facility requesting patient return today by 1100.  Dtr present to transport patient back to [facility].  Writer called facility RN line and LVM stating patient is on his way back with daughter.  Left number for call  back if needed.    Alberta Solorio RN

## 2024-02-08 NOTE — DISCHARGE SUMMARY
Discharge Summary    Darren Atkinson MRN# [id number]   YOB: 1945 Age: 78 year old     Date of Admission:  2/5/2024  Date of Discharge:  2/8/2024  9:25 AM  Admitting Physician:  Matt Trinh MD  Discharge Physician:  Matt Trinh MD     Primary Provider: Hannah Reyes [id number]          Admission Diagnoses:   Chronic non-union right femoral neck fracture           Discharge Diagnosis:   Same           Surgical Procedure:   Chronic non-union right femoral neck fracture            Secondary Diagnosis:     Patient Active Problem List   Diagnosis    Hip fracture (H)    Subarachnoid hematoma (H)    S/P total hip arthroplasty    Confusion, postoperative              Discharge Disposition:     Discharged to home             Discharge Medications:     Discharge Medication List as of 2/8/2024  9:30 AM        START taking these medications    Details   !! polyethylene glycol (MIRALAX) 17 g packet Take 17 g by mouth daily, Disp-7 packet, R-0, E-Prescribe      senna-docusate (SENOKOT-S/PERICOLACE) 8.6-50 MG tablet Take 1-2 tablets by mouth 2 times daily Take while on oral narcotics to prevent or treat constipation., Disp-30 tablet, R-0, E-PrescribeWhile taking narcotics       !! - Potential duplicate medications found. Please discuss with provider.        CONTINUE these medications which have CHANGED    Details   acetaminophen (TYLENOL) 325 MG tablet Take 2 tablets (650 mg) by mouth every 4 hours as needed for other (mild pain), Disp-100 tablet, R-0, E-Prescribe      aspirin 81 MG EC tablet Take 1 tablet (81 mg) by mouth 2 times daily, Disp-60 tablet, R-0, E-Prescribe           CONTINUE these medications which have NOT CHANGED    Details   Dextromethorphan-guaiFENesin  MG/5ML syrup Take 10 mLs by mouth every 4 hours as needed for cough, Historical      meclizine (ANTIVERT) 25 MG tablet Take 1 tablet by mouth 3 times daily as needed for dizziness, Historical      !!  polyethylene glycol (MIRALAX) 17 g packet Take 1 packet by mouth as needed for constipation, Historical       !! - Potential duplicate medications found. Please discuss with provider.        STOP taking these medications       sennosides (SENOKOT) 8.6 MG tablet Comments:   Reason for Stopping:                     Consultations:     Consultation during this admission received from internal medicine           Hospital Course:     The patient was admitted after the surgical procedure. The patient underwent an uneventful chronic non-union right femoral neck fracture . Postoperatively, anticoagulation  ASA 81 mg BID. No transfusion was required. The patient will be discharged to home. Home medications have been reconciled. Tylenol was prescribed for pain. ASA 81 mg BID  will be prescribed.             Pending Results:     None           Discharge Instructions and Follow-Up:          Discharge activity: use a walker   Discharge follow-up: Follow up with Dr. Trinh in 2 weeks   Outpatient therapy: None    Home Care agency: None    Supplies and equipment: Walker        Wound care: leave dressing in place   Other instructions: Posterior total hip precautions         Sarahi HENRY

## 2024-02-08 NOTE — PROGRESS NOTES
Diagnosis: Right total hip arthoplasty  Mental Status: Alert to self only, POD #3  Activity/dangle: Assist x1 w/ gb and walker  Diet: Tolerating regular   Vera/Voiding: Bathroom   Pain: Tylenol, robaxan   Tele/Restraints/Iso: n/a   02/LDA: RA, no IV access  D/C Date: TBD   Other Info: Restless with cares, right hip incision dressing-CDI.

## 2024-02-08 NOTE — PLAN OF CARE
Goal Outcome Evaluation:   Alert to self only, baseline confused. Up with 1/walker. Pain control with schedule tylenol. Right hip dreg CDI. Voiding well, had good breakfast. Daughter is here to take pt to the facility. Pt is discharging back to facility now. Pain well controlled at the time, of discharge.

## 2024-02-08 NOTE — PLAN OF CARE
Physical Therapy Discharge Summary    Reason for therapy discharge:    Discharged to Back to [facility]    Progress towards therapy goal(s). See goals on Care Plan in [facility] electronic health record for goal details.  Goals partially met.  Barriers to achieving goals:   discharge from facility.    Therapy recommendation(s):    Continued therapy is recommended.  Rationale/Recommendations:  Patient would benefit from continued HHPT at [facility] to progress independence while following through with hip precautions.    Goal Outcome Evaluation:

## 2024-02-19 ENCOUNTER — DOCUMENTATION ONLY (OUTPATIENT)
Dept: OTHER | Facility: CLINIC | Age: 79
End: 2024-02-19
Payer: COMMERCIAL

## 2024-12-04 ENCOUNTER — HOSPITAL ENCOUNTER (INPATIENT)
Facility: CLINIC | Age: 79
DRG: 193 | End: 2024-12-04
Attending: STUDENT IN AN ORGANIZED HEALTH CARE EDUCATION/TRAINING PROGRAM | Admitting: STUDENT IN AN ORGANIZED HEALTH CARE EDUCATION/TRAINING PROGRAM
Payer: MEDICARE

## 2024-12-04 ENCOUNTER — HOSPITAL ENCOUNTER (EMERGENCY)
Facility: HOSPITAL | Age: 79
Discharge: ANOTHER HOSPITAL | End: 2024-12-04
Attending: EMERGENCY MEDICINE | Admitting: EMERGENCY MEDICINE
Payer: MEDICARE

## 2024-12-04 VITALS
DIASTOLIC BLOOD PRESSURE: 73 MMHG | BODY MASS INDEX: 28.46 KG/M2 | HEART RATE: 79 BPM | WEIGHT: 187.2 LBS | TEMPERATURE: 98.5 F | RESPIRATION RATE: 26 BRPM | OXYGEN SATURATION: 95 % | SYSTOLIC BLOOD PRESSURE: 124 MMHG

## 2024-12-04 DIAGNOSIS — G30.9 SEVERE ALZHEIMER'S DEMENTIA WITH OTHER BEHAVIORAL DISTURBANCE, UNSPECIFIED TIMING OF DEMENTIA ONSET (H): ICD-10-CM

## 2024-12-04 DIAGNOSIS — J10.1 INFLUENZA A: ICD-10-CM

## 2024-12-04 DIAGNOSIS — A41.9 SEPSIS WITHOUT ACUTE ORGAN DYSFUNCTION, DUE TO UNSPECIFIED ORGANISM (H): ICD-10-CM

## 2024-12-04 DIAGNOSIS — F02.C18 SEVERE ALZHEIMER'S DEMENTIA WITH OTHER BEHAVIORAL DISTURBANCE, UNSPECIFIED TIMING OF DEMENTIA ONSET (H): ICD-10-CM

## 2024-12-04 DIAGNOSIS — I95.9 HYPOTENSION, UNSPECIFIED HYPOTENSION TYPE: Primary | ICD-10-CM

## 2024-12-04 LAB
ALBUMIN SERPL BCG-MCNC: 4 G/DL (ref 3.5–5.2)
ALP SERPL-CCNC: 94 U/L (ref 40–150)
ALT SERPL W P-5'-P-CCNC: 21 U/L (ref 0–70)
ANION GAP SERPL CALCULATED.3IONS-SCNC: 8 MMOL/L (ref 7–15)
AST SERPL W P-5'-P-CCNC: 25 U/L (ref 0–45)
BASE EXCESS BLDV CALC-SCNC: 1.9 MMOL/L (ref -3–3)
BASOPHILS # BLD AUTO: 0 10E3/UL (ref 0–0.2)
BASOPHILS NFR BLD AUTO: 1 %
BILIRUB SERPL-MCNC: 0.4 MG/DL
BUN SERPL-MCNC: 18.7 MG/DL (ref 8–23)
CALCIUM SERPL-MCNC: 8.8 MG/DL (ref 8.8–10.4)
CHLORIDE SERPL-SCNC: 106 MMOL/L (ref 98–107)
CREAT SERPL-MCNC: 0.82 MG/DL (ref 0.67–1.17)
EGFRCR SERPLBLD CKD-EPI 2021: 89 ML/MIN/1.73M2
EOSINOPHIL # BLD AUTO: 0 10E3/UL (ref 0–0.7)
EOSINOPHIL NFR BLD AUTO: 0 %
ERYTHROCYTE [DISTWIDTH] IN BLOOD BY AUTOMATED COUNT: 13.1 % (ref 10–15)
FLUAV RNA SPEC QL NAA+PROBE: POSITIVE
FLUBV RNA RESP QL NAA+PROBE: NEGATIVE
GLUCOSE BLDC GLUCOMTR-MCNC: 101 MG/DL (ref 70–99)
GLUCOSE SERPL-MCNC: 104 MG/DL (ref 70–99)
HCO3 BLDV-SCNC: 27 MMOL/L (ref 21–28)
HCO3 SERPL-SCNC: 25 MMOL/L (ref 22–29)
HCT VFR BLD AUTO: 37.1 % (ref 40–53)
HGB BLD-MCNC: 12.7 G/DL (ref 13.3–17.7)
IMM GRANULOCYTES # BLD: 0 10E3/UL
IMM GRANULOCYTES NFR BLD: 0 %
LACTATE SERPL-SCNC: 0.9 MMOL/L (ref 0.7–2)
LYMPHOCYTES # BLD AUTO: 0.3 10E3/UL (ref 0.8–5.3)
LYMPHOCYTES NFR BLD AUTO: 5 %
MCH RBC QN AUTO: 31.8 PG (ref 26.5–33)
MCHC RBC AUTO-ENTMCNC: 34.2 G/DL (ref 31.5–36.5)
MCV RBC AUTO: 93 FL (ref 78–100)
MONOCYTES # BLD AUTO: 0.5 10E3/UL (ref 0–1.3)
MONOCYTES NFR BLD AUTO: 9 %
NEUTROPHILS # BLD AUTO: 4.5 10E3/UL (ref 1.6–8.3)
NEUTROPHILS NFR BLD AUTO: 85 %
NRBC # BLD AUTO: 0 10E3/UL
NRBC BLD AUTO-RTO: 0 /100
O2/TOTAL GAS SETTING VFR VENT: 22 %
OXYHGB MFR BLDV: 94 % (ref 70–75)
PCO2 BLDV: 41 MM HG (ref 40–50)
PH BLDV: 7.42 [PH] (ref 7.32–7.43)
PLATELET # BLD AUTO: 168 10E3/UL (ref 150–450)
PO2 BLDV: 81 MM HG (ref 25–47)
POTASSIUM SERPL-SCNC: 4.4 MMOL/L (ref 3.4–5.3)
PROT SERPL-MCNC: 6.5 G/DL (ref 6.4–8.3)
RBC # BLD AUTO: 3.99 10E6/UL (ref 4.4–5.9)
RSV RNA SPEC NAA+PROBE: NEGATIVE
SAO2 % BLDV: 96.8 % (ref 70–75)
SARS-COV-2 RNA RESP QL NAA+PROBE: NEGATIVE
SODIUM SERPL-SCNC: 139 MMOL/L (ref 135–145)
WBC # BLD AUTO: 5.3 10E3/UL (ref 4–11)

## 2024-12-04 PROCEDURE — 272N000452 HC KIT SHRLOCK 5FR POWER PICC TRIPLE LUMEN

## 2024-12-04 PROCEDURE — 250N000009 HC RX 250: Performed by: EMERGENCY MEDICINE

## 2024-12-04 PROCEDURE — 84075 ASSAY ALKALINE PHOSPHATASE: CPT | Performed by: EMERGENCY MEDICINE

## 2024-12-04 PROCEDURE — 87040 BLOOD CULTURE FOR BACTERIA: CPT | Performed by: EMERGENCY MEDICINE

## 2024-12-04 PROCEDURE — 250N000011 HC RX IP 250 OP 636: Mod: JZ | Performed by: INTERNAL MEDICINE

## 2024-12-04 PROCEDURE — 99418 PROLNG IP/OBS E/M EA 15 MIN: CPT | Performed by: PHYSICIAN ASSISTANT

## 2024-12-04 PROCEDURE — 36569 INSJ PICC 5 YR+ W/O IMAGING: CPT

## 2024-12-04 PROCEDURE — 96361 HYDRATE IV INFUSION ADD-ON: CPT

## 2024-12-04 PROCEDURE — 80048 BASIC METABOLIC PNL TOTAL CA: CPT | Performed by: EMERGENCY MEDICINE

## 2024-12-04 PROCEDURE — 3E043XZ INTRODUCTION OF VASOPRESSOR INTO CENTRAL VEIN, PERCUTANEOUS APPROACH: ICD-10-PCS | Performed by: PHYSICIAN ASSISTANT

## 2024-12-04 PROCEDURE — 83605 ASSAY OF LACTIC ACID: CPT | Performed by: EMERGENCY MEDICINE

## 2024-12-04 PROCEDURE — 85014 HEMATOCRIT: CPT | Performed by: EMERGENCY MEDICINE

## 2024-12-04 PROCEDURE — P9047 ALBUMIN (HUMAN), 25%, 50ML: HCPCS | Mod: JZ | Performed by: INTERNAL MEDICINE

## 2024-12-04 PROCEDURE — 36415 COLL VENOUS BLD VENIPUNCTURE: CPT | Performed by: EMERGENCY MEDICINE

## 2024-12-04 PROCEDURE — 250N000013 HC RX MED GY IP 250 OP 250 PS 637: Performed by: EMERGENCY MEDICINE

## 2024-12-04 PROCEDURE — 99291 CRITICAL CARE FIRST HOUR: CPT | Mod: 25

## 2024-12-04 PROCEDURE — 96365 THER/PROPH/DIAG IV INF INIT: CPT

## 2024-12-04 PROCEDURE — 96366 THER/PROPH/DIAG IV INF ADDON: CPT

## 2024-12-04 PROCEDURE — 258N000003 HC RX IP 258 OP 636: Performed by: EMERGENCY MEDICINE

## 2024-12-04 PROCEDURE — 250N000009 HC RX 250: Performed by: PHYSICIAN ASSISTANT

## 2024-12-04 PROCEDURE — 96368 THER/DIAG CONCURRENT INF: CPT

## 2024-12-04 PROCEDURE — 82805 BLOOD GASES W/O2 SATURATION: CPT | Performed by: EMERGENCY MEDICINE

## 2024-12-04 PROCEDURE — 87637 SARSCOV2&INF A&B&RSV AMP PRB: CPT | Performed by: EMERGENCY MEDICINE

## 2024-12-04 PROCEDURE — 82962 GLUCOSE BLOOD TEST: CPT

## 2024-12-04 PROCEDURE — 80053 COMPREHEN METABOLIC PANEL: CPT | Performed by: EMERGENCY MEDICINE

## 2024-12-04 PROCEDURE — 85004 AUTOMATED DIFF WBC COUNT: CPT | Performed by: EMERGENCY MEDICINE

## 2024-12-04 PROCEDURE — 99223 1ST HOSP IP/OBS HIGH 75: CPT | Performed by: PHYSICIAN ASSISTANT

## 2024-12-04 RX ORDER — SENNOSIDES 8.6 MG
1 CAPSULE ORAL 3 TIMES DAILY
COMMUNITY

## 2024-12-04 RX ORDER — GABAPENTIN 100 MG/1
1 CAPSULE ORAL 2 TIMES DAILY
COMMUNITY

## 2024-12-04 RX ORDER — OSELTAMIVIR PHOSPHATE 75 MG/1
75 CAPSULE ORAL ONCE
Status: COMPLETED | OUTPATIENT
Start: 2024-12-04 | End: 2024-12-04

## 2024-12-04 RX ORDER — POLYDEXTROSE 2.5 G
2 TABLET,CHEWABLE ORAL 2 TIMES DAILY
COMMUNITY

## 2024-12-04 RX ORDER — AMOXICILLIN 250 MG
1-2 CAPSULE ORAL DAILY PRN
COMMUNITY

## 2024-12-04 RX ORDER — GUAIFENESIN 200 MG/10ML
200 LIQUID ORAL EVERY 4 HOURS PRN
COMMUNITY

## 2024-12-04 RX ORDER — ACETAMINOPHEN 325 MG/1
975 TABLET ORAL ONCE
Status: COMPLETED | OUTPATIENT
Start: 2024-12-04 | End: 2024-12-04

## 2024-12-04 RX ORDER — ROPIVACAINE IN 0.9% SOD CHL/PF 0.1 %
.01-.125 PLASTIC BAG, INJECTION (ML) EPIDURAL CONTINUOUS
Status: DISCONTINUED | OUTPATIENT
Start: 2024-12-04 | End: 2024-12-04 | Stop reason: HOSPADM

## 2024-12-04 RX ORDER — LIDOCAINE 40 MG/G
CREAM TOPICAL
Status: DISCONTINUED | OUTPATIENT
Start: 2024-12-04 | End: 2024-12-04 | Stop reason: HOSPADM

## 2024-12-04 RX ORDER — ALBUMIN (HUMAN) 12.5 G/50ML
25 SOLUTION INTRAVENOUS ONCE
Status: COMPLETED | OUTPATIENT
Start: 2024-12-04 | End: 2024-12-04

## 2024-12-04 RX ORDER — DIVALPROEX SODIUM 125 MG/1
1 TABLET, DELAYED RELEASE ORAL 2 TIMES DAILY
COMMUNITY

## 2024-12-04 RX ORDER — LOPERAMIDE HYDROCHLORIDE 2 MG/1
2-4 TABLET ORAL 4 TIMES DAILY PRN
COMMUNITY

## 2024-12-04 RX ORDER — ESCITALOPRAM OXALATE 20 MG/1
1 TABLET ORAL DAILY
COMMUNITY

## 2024-12-04 RX ADMIN — SODIUM CHLORIDE 500 ML: 9 INJECTION, SOLUTION INTRAVENOUS at 19:52

## 2024-12-04 RX ADMIN — NOREPINEPHRINE BITARTRATE 0.03 MCG/KG/MIN: 0.02 INJECTION, SOLUTION INTRAVENOUS at 20:16

## 2024-12-04 RX ADMIN — ALBUMIN HUMAN 25 G: 0.25 SOLUTION INTRAVENOUS at 21:09

## 2024-12-04 RX ADMIN — LIDOCAINE HYDROCHLORIDE 2 ML: 10 INJECTION, SOLUTION EPIDURAL; INFILTRATION; INTRACAUDAL; PERINEURAL at 22:14

## 2024-12-04 RX ADMIN — OSELTAMIVIR PHOSPHATE 75 MG: 75 CAPSULE ORAL at 19:54

## 2024-12-04 RX ADMIN — SODIUM CHLORIDE, POTASSIUM CHLORIDE, SODIUM LACTATE AND CALCIUM CHLORIDE 500 ML: 600; 310; 30; 20 INJECTION, SOLUTION INTRAVENOUS at 18:27

## 2024-12-04 RX ADMIN — SODIUM CHLORIDE, POTASSIUM CHLORIDE, SODIUM LACTATE AND CALCIUM CHLORIDE 500 ML: 600; 310; 30; 20 INJECTION, SOLUTION INTRAVENOUS at 17:52

## 2024-12-04 RX ADMIN — NOREPINEPHRINE BITARTRATE 0.08 MCG/KG/MIN: 0.02 INJECTION, SOLUTION INTRAVENOUS at 23:50

## 2024-12-04 RX ADMIN — ACETAMINOPHEN 975 MG: 325 TABLET ORAL at 19:54

## 2024-12-04 ASSESSMENT — ACTIVITIES OF DAILY LIVING (ADL)
ADLS_ACUITY_SCORE: 53

## 2024-12-04 ASSESSMENT — COLUMBIA-SUICIDE SEVERITY RATING SCALE - C-SSRS: IS THE PATIENT NOT ABLE TO COMPLETE C-SSRS: UNABLE TO VERBALIZE

## 2024-12-04 NOTE — ED TRIAGE NOTES
Pt BIBA for fever of 103 at SNF.  Pt given APAP PTA, pt still febril upon arrival.  Pt has hx of dementia, per family member pt has been coughing for over a month.  Pt sats low upon arrival, placed on 2L via  NC. Pt oriented only to self, which is baseline for him according to pt's daughter.     Triage Assessment (Adult)       Row Name 12/04/24 3931          Triage Assessment    Airway WDL WDL        Respiratory WDL    Respiratory WDL X;cough  Pt sats 88% on RA pt placed on 2L via NC.     Cough Frequency infrequent     Cough Type --  report of cough for past month        Skin Circulation/Temperature WDL    Skin Circulation/Temperature WDL WDL        Cardiac WDL    Cardiac WDL WDL        Cognitive/Neuro/Behavioral WDL    Cognitive/Neuro/Behavioral WDL X;orientation  pt oriented to self only, according to EMS daughter states this is his baseline mentation.        [place] Coma Scale    Best Eye Response 4-->(E4) spontaneous     Best Motor Response 6-->(M6) obeys commands     Best Verbal Response 4-->(V4) confused     [place] Coma Scale Score 14

## 2024-12-04 NOTE — ED PROVIDER NOTES
EMERGENCY DEPARTMENT ENCOUNTER      NAME: Darren Atkinson  AGE: 79 year old male  YOB: 1945  MRN: [id number]  EVALUATION DATE & TIME: 2024  4:51 PM    PCP: Hannah Reyes Physician    ED PROVIDER: Juan Daniel Acevedo D.O.      Chief Complaint   Patient presents with    Fever    Cough       FINAL IMPRESSION:  1. Sepsis without acute organ dysfunction, due to unspecified organism (H)    2. Influenza A        ED COURSE & MEDICAL DECISION MAKIN:02 PM I met with the patient to gather history and to perform my initial exam. I discussed the plan for care while in the Emergency Department.  6:09 PM Daughter has arrived to the ED. This is not the patients mental status at baseline. Typically he is oriented to place and situation, but is significantly more confused today.  7:01 PM Spoke with patient and family. They are amenable to admission.  9:36 PM Patient will be admitted to [facility], spoke to accepting hospitalist Dr. Michel.       Pertinent Labs & Imaging studies reviewed. (See chart for details)  79 year old male presents to the Emergency Department for evaluation of fever and altered mental status.  Patient did come from a memory care unit, but according to the patient's family his orientation is worse than typical where he normally knows what is going on around him and does not at this time but is still oriented to self.  Exam on this patient was otherwise largely unremarkable.  He is not tachycardic but was febrile to 103 at the care facility prior to coming to the emergency department.  Lactate did not come back elevated and white blood cell count was also negative, however the patient did have a positive influenza test without other obvious cause of symptoms and fever.  However he also dropped his blood pressure while in the emergency department, and only mild improvement of his hypotension with IV fluids.  Therefore, we decided to start a Levophed drip as I am concerned for the potential for  sepsis secondary to viral etiology.  A PICC line has been placed in the emergency department.  Due to lack of bed space at [facility] we will transfer the ICU at [facility] for further management.    Medical Decision Making  Obtained supplemental history:Supplemental history obtained?: Family Member/Significant Other and EMS  Reviewed external records: External records reviewed?: Inpatient Record: [facility] 7/4/24  Care impacted by chronic illness:Cerebrovascular Disease  Did you consider but not order tests?: Work up considered but not performed and documented in chart, if applicable  Did you interpret images independently?: Independent interpretation of ECG and images noted in documentation, when applicable.  Consultation discussion with other provider:Did you involve another provider (consultant, , pharmacy, etc.)?: I discussed the care with another health care provider, see documentation for details.  Admit.    MIPS: Not Applicable        At the conclusion of the encounter I discussed the results of all of the tests and the disposition. The questions were answered. The patient or family acknowledged understanding and was agreeable with the care plan.        HPI    Patient information was obtained from: patient, family    Use of : N/A        Darren Atkinson is a 79 year old male who presents with a fever and cough.    Per patient, he is unsure why he is here. Denies any pain.    Per family, he is only oriented to self and this is his baseline. When family arrived, they confirmed this is not his baseline. He is typically oriented to place and situation. He is not today and is more confused than baseline. Patient has had a cough for over a month. They administered Tylenol prior to EMS arrival. He does not wear oxygen at home.     Per EMS, 88% on room air. Put on oxygen upon arrival.    Per Chart Review: Admitted to [facility] for a fall. No head injuries or LOC. In ED, trop 25, CK  normal, UA normal. CT head with acute left abnormality, cervical/thoracic/lumbar spine without acute fractures. CT lumbar showed no acute fracture or traumatic subluxation. Moderate-severe spinal canal stenosis at L4-L5, resulting from symmetric disc bulging and facet joint hypertrophy and enlargement of the partially imaged abdominal aorta to a diameter of at least 3.6 cm, consistent with abdominal aortic aneurysm. XR right hip without acute fracture. Discharged back to his facility with occupational and physical therapy.     Medical History: SAH, cognitive impairment    PAST MEDICAL HISTORY:  Past Medical History:   Diagnosis Date    Elevated prostate specific antigen (PSA)     Hip fracture (H) 2023    Memory loss     Subarachnoid hematoma (H) 12/2021       PAST SURGICAL HISTORY:  Past Surgical History:   Procedure Laterality Date    ARTHROPLASTY HIP Right 2/5/2024    Procedure: complex right total hip arthroplasty - posterior approach;  Surgeon: Matt Trinh MD;  Location:  OR    BACK SURGERY      PICC TRIPLE LUMEN PLACEMENT  12/4/2024         CURRENT MEDICATIONS:    Current Facility-Administered Medications   Medication Dose Route Frequency Provider Last Rate Last Admin    lidocaine (LMX4) cream   Topical Q1H PRN Juan Daniel Acevedo DO        lidocaine 1 % 0.1-5 mL  0.1-5 mL Other Q1H PRN Juan Daniel Acevedo DO   2 mL at 12/04/24 2214    norepinephrine (LEVOPHED) 4 mg in  mL PERIPHERAL infusion  0.01-0.125 mcg/kg/min Intravenous Continuous Juan Daniel Acevedo DO 19.1 mL/hr at 12/04/24 2243 0.06 mcg/kg/min at 12/04/24 2243    sodium chloride (PF) 0.9% PF flush 10-40 mL  10-40 mL Intracatheter Once PRN Jaun Daniel Acevedo DO        sodium chloride (PF) 0.9% PF flush 3 mL  3 mL Intracatheter q1 min prn Juan Daniel Acevedo DO        sodium chloride (PF) 0.9% PF flush 3 mL  3 mL Intracatheter Q8H Juan Daniel Acevedo DO         Current Outpatient Medications   Medication Sig Dispense Refill     acetaminophen (TYLENOL 8 HOUR) 650 MG CR tablet Take 650 mg by mouth 3 times daily.      divalproex sodium delayed-release (DEPAKOTE) 125 MG DR tablet Take 125 mg by mouth 2 times daily. For Anxiety      escitalopram (LEXAPRO) 20 MG tablet Take 20 mg by mouth daily.      gabapentin (NEURONTIN) 100 MG capsule Take 100 mg by mouth 2 times daily.      guaiFENesin (ROBITUSSIN) 20 mg/mL liquid Take 200 mg by mouth every 4 hours as needed for cough.      loperamide (IMODIUM A-D) 2 MG tablet Take 2-4 mg by mouth 4 times daily as needed for diarrhea. Take 2 tablets after the first loose stool, followed by 1 tablet after each loose stool as needed      senna-docusate (SENOKOT-S/PERICOLACE) 8.6-50 MG tablet Take 1-2 tablets by mouth daily as needed for constipation.      Vitafusion Fiber Well 2.5 g CHEW Take 2 chew tab by mouth 2 times daily.           ALLERGIES:  Allergies   Allergen Reactions    Morphine Hallucination    Oxycodone Hallucination       FAMILY HISTORY:  No family history on file.    SOCIAL HISTORY:  Social History     Socioeconomic History    Marital status: Legally      Spouse name: None    Number of children: None    Years of education: None    Highest education level: None   Tobacco Use    Smoking status: Former     Current packs/day: 0.00     Average packs/day: 1 pack/day for 50.0 years (50.0 ttl pk-yrs)     Types: Cigarettes     Start date:      Quit date: 2019     Years since quittin.9    Smokeless tobacco: Never   Substance and Sexual Activity    Alcohol use: Not Currently    Drug use: Never     Social Drivers of Health     Financial Resource Strain: Low Risk  (2024)    Financial Resource Strain     Within the past 12 months, have you or your family members you live with been unable to get utilities (heat, electricity) when it was really needed?: No   Food Insecurity: Low Risk  (2024)    Food Insecurity     Within the past 12 months, did you worry that your food would run  out before you got money to buy more?: No     Within the past 12 months, did the food you bought just not last and you didn t have money to get more?: No   Transportation Needs: Low Risk  (1/30/2024)    Transportation Needs     Within the past 12 months, has lack of transportation kept you from medical appointments, getting your medicines, non-medical meetings or appointments, work, or from getting things that you need?: No    Received from [facility]    Social Connections   Housing Stability: Low Risk  (1/30/2024)    Housing Stability     Do you have housing? : Yes     Are you worried about losing your housing?: No       VITALS:  Patient Vitals for the past 24 hrs:   BP Temp Temp src Pulse Resp SpO2 Weight   12/04/24 2244 -- -- -- 72 27 95 % --   12/04/24 2234 123/70 -- -- 68 25 -- --   12/04/24 2224 124/67 -- -- 72 27 -- --   12/04/24 2200 109/71 -- -- 66 22 -- --   12/04/24 2145 (!) 83/50 -- -- 73 22 -- --   12/04/24 2125 109/55 98.5  F (36.9  C) Oral 71 30 -- --   12/04/24 2120 114/73 -- -- 75 -- -- --   12/04/24 2110 109/64 -- -- 64 27 -- --   12/04/24 2100 103/66 -- -- 65 16 91 % --   12/04/24 2037 100/62 -- -- 67 28 -- --   12/04/24 2030 (!) 83/59 -- -- 74 27 90 % --   12/04/24 2025 (!) 80/51 -- -- 73 26 90 % --   12/04/24 2020 (!) 82/57 -- -- 78 24 -- --   12/04/24 2015 (!) 84/55 -- -- 82 29 -- --   12/04/24 2010 (!) 87/52 -- -- 79 -- -- --   12/04/24 2007 (!) 82/54 -- -- 75 30 -- --   12/04/24 2000 (!) 89/56 -- -- 75 23 -- --   12/04/24 1955 (!) 88/54 -- -- 78 27 -- --   12/04/24 1930 (!) 85/55 -- -- 79 27 -- --   12/04/24 1915 (!) 81/49 -- -- 82 22 -- --   12/04/24 1910 (!) 88/55 -- -- 80 23 -- --   12/04/24 1841 -- (!) 100.6  F (38.1  C) -- -- -- -- --   12/04/24 1835 (!) 81/54 -- -- 77 22 95 % --   12/04/24 1825 (!) 78/51 -- -- 80 19 95 % --   12/04/24 1820 (!) 76/52 -- -- 82 17 95 % --   12/04/24 1815 (!) 76/52 -- -- -- -- -- --   12/04/24 1810 (!) 80/52 -- -- 86 -- 94  % --   12/04/24 1800 (!) 82/50 -- -- 87 -- 94 % --   12/04/24 1730 95/60 -- -- 90 -- 94 % --   12/04/24 1715 112/62 -- -- 86 -- 95 % --   12/04/24 1700 110/69 -- -- 101 -- 92 % --   12/04/24 1655 -- 100.2  F (37.9  C) Oral -- -- -- 84.9 kg (187 lb 3.2 oz)   12/04/24 1652 107/61 -- -- 93 -- 90 % --       PHYSICAL EXAM    VITAL SIGNS: /70   Pulse 72   Temp 98.5  F (36.9  C) (Oral)   Resp 27   Wt 84.9 kg (187 lb 3.2 oz)   SpO2 95%   BMI 28.46 kg/m      General Appearance: Well-appearing, well-nourished, no acute distress   Head:  Normocephalic, without obvious abnormality, atraumatic  Eyes:  PERRL, conjunctiva/corneas clear, EOM's intact,  ENT:  Lips, mucosa, and tongue normal, membranes are moist without pallor  Neck:  Normal ROM, symmetrical, trachea midline    Cardio:  Regular rate and rhythm, no murmur, rub or gallop, 2+ pulses symmetric in all extremities  Pulm:  Clear to auscultation bilaterally, respirations unlabored,  Abdomen:  Soft, non-tender, no rebound or guarding.  Musculoskeletal: Full ROM, no edema, no cyanosis, good ROM of major joints  Integument:  Warm, Dry, No erythema, No rash.    Neurologic:  Alert & oriented to self only.  No focal deficits appreciated.        LABS  Results for orders placed or performed during the hospital encounter of 12/04/24 (from the past 24 hours)   CBC with platelets differential    Narrative    The following orders were created for panel order CBC with platelets differential.  Procedure                               Abnormality         Status                     ---------                               -----------         ------                     CBC with platelets and d...[[id number]]  Abnormal            Final result                 Please view results for these tests on the individual orders.   Comprehensive metabolic panel   Result Value Ref Range    Sodium 139 135 - 145 mmol/L    Potassium 4.4 3.4 - 5.3 mmol/L    Carbon Dioxide (CO2) 25 22 - 29 mmol/L     Anion Gap 8 7 - 15 mmol/L    Urea Nitrogen 18.7 8.0 - 23.0 mg/dL    Creatinine 0.82 0.67 - 1.17 mg/dL    GFR Estimate 89 >60 mL/min/1.73m2    Calcium 8.8 8.8 - 10.4 mg/dL    Chloride 106 98 - 107 mmol/L    Glucose 104 (H) 70 - 99 mg/dL    Alkaline Phosphatase 94 40 - 150 U/L    AST 25 0 - 45 U/L    ALT 21 0 - 70 U/L    Protein Total 6.5 6.4 - 8.3 g/dL    Albumin 4.0 3.5 - 5.2 g/dL    Bilirubin Total 0.4 <=1.2 mg/dL   Lactic Acid Whole Blood with 1X Repeat in 2 HR when >2   Result Value Ref Range    Lactic Acid, Initial 0.9 0.7 - 2.0 mmol/L   Blood gas venous   Result Value Ref Range    pH Venous 7.42 7.32 - 7.43    pCO2 Venous 41 40 - 50 mm Hg    pO2 Venous 81 (H) 25 - 47 mm Hg    Bicarbonate Venous 27 21 - 28 mmol/L    Base Excess/Deficit Venous 1.9 -3.0 - 3.0 mmol/L    FIO2 22     Oxyhemoglobin Venous 94 (H) 70 - 75 %    O2 Sat, Venous 96.8 (H) 70.0 - 75.0 %    Narrative    In healthy individuals, oxyhemoglobin (O2Hb) and oxygen saturation (SO2) are approximately equal. In the presence of dyshemoglobins, oxyhemoglobin can be considerably lower than oxygen saturation.   CBC with platelets and differential   Result Value Ref Range    WBC Count 5.3 4.0 - 11.0 10e3/uL    RBC Count 3.99 (L) 4.40 - 5.90 10e6/uL    Hemoglobin 12.7 (L) 13.3 - 17.7 g/dL    Hematocrit 37.1 (L) 40.0 - 53.0 %    MCV 93 78 - 100 fL    MCH 31.8 26.5 - 33.0 pg    MCHC 34.2 31.5 - 36.5 g/dL    RDW 13.1 10.0 - 15.0 %    Platelet Count 168 150 - 450 10e3/uL    % Neutrophils 85 %    % Lymphocytes 5 %    % Monocytes 9 %    % Eosinophils 0 %    % Basophils 1 %    % Immature Granulocytes 0 %    NRBCs per 100 WBC 0 <1 /100    Absolute Neutrophils 4.5 1.6 - 8.3 10e3/uL    Absolute Lymphocytes 0.3 (L) 0.8 - 5.3 10e3/uL    Absolute Monocytes 0.5 0.0 - 1.3 10e3/uL    Absolute Eosinophils 0.0 0.0 - 0.7 10e3/uL    Absolute Basophils 0.0 0.0 - 0.2 10e3/uL    Absolute Immature Granulocytes 0.0 <=0.4 10e3/uL    Absolute NRBCs 0.0 10e3/uL   Influenza A/B, RSV and  SARS-CoV2 PCR (COVID-19) Nasopharyngeal    Specimen: Nasopharyngeal; Swab   Result Value Ref Range    Influenza A PCR Positive (A) Negative    Influenza B PCR Negative Negative    RSV PCR Negative Negative    SARS CoV2 PCR Negative Negative    Narrative    Testing was performed using the Xpert Xpress CoV2/Flu/RSV Assay on the Cepheid GeneXpert Instrument. This test should be ordered for the detection of SARS-CoV2, influenza, and RSV viruses in individuals with signs and symptoms of respiratory tract infection. This test is for in vitro diagnostic use under the [place] FDA for laboratories certified under CLIA to perform high or moderate complexity testing. This test has been [place] FDA cleared. A negative result does not rule out the presence of PCR inhibitors in the specimen or target RNA in concentration below the limit of detection for the assay. If only one viral target is positive but coinfection with multiple targets is suspected, the sample should be re-tested with another FDA cleared, approved, or authorized test, if coninfection would change clinical management. This test was validated by the [facility]. These laboratories are certified under the Clinical Laboratory Improvement Amendments of 1988 (CLIA-88) as qualified to perfom high complexity laboratory testing.   Head CT w/o contrast    Narrative    EXAM: CT HEAD W/O CONTRAST  LOCATION: [facility]  DATE: 12/4/2024    INDICATION: AMS  COMPARISON: None.  TECHNIQUE: Routine CT Head without IV contrast. Multiplanar reformats. Dose reduction techniques were used.    FINDINGS:  INTRACRANIAL CONTENTS: No acute intracranial hemorrhage. No hydrocephalus or extra-axial hemorrhage. Moderate ventriculomegaly and moderate global cortical volume loss with expected dilatation of the ventricular system. Intracranial atheromatous disease.    Remote ischemic findings in the inferior left temporal lobe predominantly  posteriorly.    VISUALIZED ORBITS/SINUSES/MASTOIDS: No intraorbital abnormality. No paranasal sinus mucosal disease. No middle ear or mastoid effusion.    BONES/SOFT TISSUES: No acute abnormality.      Impression    IMPRESSION:  1.  No acute findings. Chronic changes as above.   XR Chest 2 Views    Narrative    EXAM: XR CHEST 2 VIEWS  LOCATION: [facility]  DATE: 12/4/2024    INDICATION: Fever  COMPARISON: 5/6/2019      Impression    IMPRESSION: Heart size is upper limits of normal for AP technique. Tortuous thoracic aorta. Lung volumes are diminished. Increased interstitial markings bilaterally may represent hypoventilatory change. No overt failure, lobar consolidation or large   effusions. No acute bony abnormalities.         RADIOLOGY  XR Chest 2 Views   Final Result   IMPRESSION: Heart size is upper limits of normal for AP technique. Tortuous thoracic aorta. Lung volumes are diminished. Increased interstitial markings bilaterally may represent hypoventilatory change. No overt failure, lobar consolidation or large    effusions. No acute bony abnormalities.      Head CT w/o contrast   Final Result   IMPRESSION:   1.  No acute findings. Chronic changes as above.             PROCEDURES:  NA      MEDICATIONS GIVEN IN THE EMERGENCY:  Medications   sodium chloride (PF) 0.9% PF flush 3 mL (has no administration in time range)   sodium chloride (PF) 0.9% PF flush 3 mL (3 mLs Intracatheter Not Given 12/4/24 1757)   norepinephrine (LEVOPHED) 4 mg in  mL PERIPHERAL infusion (0.06 mcg/kg/min × 84.9 kg Intravenous Rate/Dose Change 12/4/24 2243)   lidocaine 1 % 0.1-5 mL (2 mLs Other $Given 12/4/24 2214)   lidocaine (LMX4) cream (has no administration in time range)   sodium chloride (PF) 0.9% PF flush 10-40 mL (has no administration in time range)   lactated ringers BOLUS 500 mL (0 mLs Intravenous Stopped 12/4/24 1919)   lactated ringers BOLUS 500 mL (0 mLs Intravenous Stopped 12/4/24 1909)    oseltamivir (TAMIFLU) capsule 75 mg (75 mg Oral $Given 12/4/24 1954)   acetaminophen (TYLENOL) tablet 975 mg (975 mg Oral $Given 12/4/24 1954)   sodium chloride 0.9% BOLUS 500 mL (0 mLs Intravenous Stopped 12/4/24 2021)   albumin human 25 % injection 25 g (0 g Intravenous Stopped 12/4/24 2134)       NEW PRESCRIPTIONS STARTED AT TODAY'S ER VISIT  New Prescriptions    No medications on file        I, Julisa Emery, am serving as a scribe to document services personally performed by Juan Daniel Acevedo D.O., based on my observations and the provider's statements to me.  I, Juan Daniel Acevedo D.O., attest that Julisa Emery is acting in a scribe capacity, has observed my performance of the services and has documented them in accordance with my direction.     Juan Daniel Acevedo D.O.  Emergency Medicine  [facility] EMERGENCY DEPARTMENT  [address] [place] [id number] [phone]  Dept: [phone]       Juan Daniel Acevdeo,   12/04/24 7580

## 2024-12-05 ENCOUNTER — APPOINTMENT (OUTPATIENT)
Dept: OCCUPATIONAL THERAPY | Facility: CLINIC | Age: 79
DRG: 193 | End: 2024-12-05
Attending: STUDENT IN AN ORGANIZED HEALTH CARE EDUCATION/TRAINING PROGRAM
Payer: MEDICARE

## 2024-12-05 VITALS
BODY MASS INDEX: 25.96 KG/M2 | TEMPERATURE: 98.1 F | WEIGHT: 171.3 LBS | DIASTOLIC BLOOD PRESSURE: 85 MMHG | HEART RATE: 47 BPM | RESPIRATION RATE: 18 BRPM | OXYGEN SATURATION: 94 % | HEIGHT: 68 IN | SYSTOLIC BLOOD PRESSURE: 112 MMHG

## 2024-12-05 PROBLEM — J10.1 INFLUENZA A: Status: ACTIVE | Noted: 2024-12-05

## 2024-12-05 PROBLEM — F03.90 DEMENTIA (H): Status: ACTIVE | Noted: 2024-12-05

## 2024-12-05 PROBLEM — R57.9 SHOCK (H): Status: ACTIVE | Noted: 2024-12-05

## 2024-12-05 PROBLEM — N40.0 BPH (BENIGN PROSTATIC HYPERPLASIA): Status: ACTIVE | Noted: 2024-12-05

## 2024-12-05 PROBLEM — Z86.79 HISTORY OF SUBARACHNOID HEMORRHAGE: Status: ACTIVE | Noted: 2024-01-30

## 2024-12-05 PROBLEM — F41.1 GENERALIZED ANXIETY DISORDER: Status: ACTIVE | Noted: 2024-12-05

## 2024-12-05 PROBLEM — J96.01 ACUTE RESPIRATORY FAILURE WITH HYPOXIA (H): Status: ACTIVE | Noted: 2024-12-05

## 2024-12-05 LAB
ADV 40+41 DNA STL QL NAA+NON-PROBE: NEGATIVE
ALBUMIN SERPL BCG-MCNC: 3.7 G/DL (ref 3.5–5.2)
ALBUMIN UR-MCNC: NEGATIVE MG/DL
ALP SERPL-CCNC: 79 U/L (ref 40–150)
ALT SERPL W P-5'-P-CCNC: 20 U/L (ref 0–70)
ANION GAP SERPL CALCULATED.3IONS-SCNC: 7 MMOL/L (ref 7–15)
APPEARANCE UR: CLEAR
AST SERPL W P-5'-P-CCNC: 28 U/L (ref 0–45)
ASTRO TYP 1-8 RNA STL QL NAA+NON-PROBE: NEGATIVE
BACTERIA BLD CULT: NORMAL
BASOPHILS # BLD AUTO: 0 10E3/UL (ref 0–0.2)
BASOPHILS NFR BLD AUTO: 1 %
BILIRUB SERPL-MCNC: 0.4 MG/DL
BILIRUB UR QL STRIP: NEGATIVE
BUN SERPL-MCNC: 14.4 MG/DL (ref 8–23)
C CAYETANENSIS DNA STL QL NAA+NON-PROBE: NEGATIVE
C DIFF TOX B STL QL: NEGATIVE
CALCIUM SERPL-MCNC: 8.7 MG/DL (ref 8.8–10.4)
CAMPYLOBACTER DNA SPEC NAA+PROBE: NEGATIVE
CHLORIDE SERPL-SCNC: 105 MMOL/L (ref 98–107)
COLOR UR AUTO: YELLOW
CREAT SERPL-MCNC: 0.76 MG/DL (ref 0.67–1.17)
CRP SERPL-MCNC: 59.49 MG/L
CRYPTOSP DNA STL QL NAA+NON-PROBE: NEGATIVE
E COLI O157 DNA STL QL NAA+NON-PROBE: NORMAL
E HISTOLYT DNA STL QL NAA+NON-PROBE: NEGATIVE
EAEC ASTA GENE ISLT QL NAA+PROBE: NEGATIVE
EC STX1+STX2 GENES STL QL NAA+NON-PROBE: NEGATIVE
EGFRCR SERPLBLD CKD-EPI 2021: >90 ML/MIN/1.73M2
EOSINOPHIL # BLD AUTO: 0 10E3/UL (ref 0–0.7)
EOSINOPHIL NFR BLD AUTO: 0 %
EPEC EAE GENE STL QL NAA+NON-PROBE: NEGATIVE
ERYTHROCYTE [DISTWIDTH] IN BLOOD BY AUTOMATED COUNT: 13 % (ref 10–15)
ETEC LTA+ST1A+ST1B TOX ST NAA+NON-PROBE: NEGATIVE
G LAMBLIA DNA STL QL NAA+NON-PROBE: NEGATIVE
GLUCOSE BLDC GLUCOMTR-MCNC: 120 MG/DL (ref 70–99)
GLUCOSE BLDC GLUCOMTR-MCNC: 141 MG/DL (ref 70–99)
GLUCOSE SERPL-MCNC: 93 MG/DL (ref 70–99)
GLUCOSE UR STRIP-MCNC: NEGATIVE MG/DL
HCO3 SERPL-SCNC: 27 MMOL/L (ref 22–29)
HCT VFR BLD AUTO: 34.8 % (ref 40–53)
HGB BLD-MCNC: 11.4 G/DL (ref 13.3–17.7)
HGB UR QL STRIP: NEGATIVE
IMM GRANULOCYTES # BLD: 0 10E3/UL
IMM GRANULOCYTES NFR BLD: 1 %
KETONES UR STRIP-MCNC: 5 MG/DL
LEUKOCYTE ESTERASE UR QL STRIP: NEGATIVE
LYMPHOCYTES # BLD AUTO: 0.5 10E3/UL (ref 0.8–5.3)
LYMPHOCYTES NFR BLD AUTO: 13 %
MCH RBC QN AUTO: 31.4 PG (ref 26.5–33)
MCHC RBC AUTO-ENTMCNC: 32.8 G/DL (ref 31.5–36.5)
MCV RBC AUTO: 96 FL (ref 78–100)
MONOCYTES # BLD AUTO: 0.6 10E3/UL (ref 0–1.3)
MONOCYTES NFR BLD AUTO: 15 %
MUCOUS THREADS #/AREA URNS LPF: PRESENT /LPF
NEUTROPHILS # BLD AUTO: 2.8 10E3/UL (ref 1.6–8.3)
NEUTROPHILS NFR BLD AUTO: 71 %
NITRATE UR QL: NEGATIVE
NOROVIRUS GI+II RNA STL QL NAA+NON-PROBE: NEGATIVE
NRBC # BLD AUTO: 0 10E3/UL
NRBC BLD AUTO-RTO: 0 /100
P SHIGELLOIDES DNA STL QL NAA+NON-PROBE: NEGATIVE
PH UR STRIP: 5 [PH] (ref 5–7)
PLATELET # BLD AUTO: 122 10E3/UL (ref 150–450)
POTASSIUM SERPL-SCNC: 4.4 MMOL/L (ref 3.4–5.3)
PROT SERPL-MCNC: 5.8 G/DL (ref 6.4–8.3)
RBC # BLD AUTO: 3.63 10E6/UL (ref 4.4–5.9)
RBC URINE: 3 /HPF
RVA RNA STL QL NAA+NON-PROBE: NEGATIVE
SALMONELLA SP RPOD STL QL NAA+PROBE: NEGATIVE
SAPO I+II+IV+V RNA STL QL NAA+NON-PROBE: NEGATIVE
SHIGELLA SP+EIEC IPAH ST NAA+NON-PROBE: NEGATIVE
SODIUM SERPL-SCNC: 139 MMOL/L (ref 135–145)
SP GR UR STRIP: 1.03 (ref 1–1.03)
SQUAMOUS EPITHELIAL: <1 /HPF
UROBILINOGEN UR STRIP-MCNC: NORMAL MG/DL
V CHOLERAE DNA SPEC QL NAA+PROBE: NEGATIVE
VIBRIO DNA SPEC NAA+PROBE: NEGATIVE
WBC # BLD AUTO: 3.9 10E3/UL (ref 4–11)
WBC URINE: 1 /HPF
Y ENTEROCOL DNA STL QL NAA+PROBE: NEGATIVE

## 2024-12-05 PROCEDURE — 250N000013 HC RX MED GY IP 250 OP 250 PS 637: Performed by: INTERNAL MEDICINE

## 2024-12-05 PROCEDURE — 250N000009 HC RX 250: Performed by: STUDENT IN AN ORGANIZED HEALTH CARE EDUCATION/TRAINING PROGRAM

## 2024-12-05 PROCEDURE — 99222 1ST HOSP IP/OBS MODERATE 55: CPT | Performed by: FAMILY MEDICINE

## 2024-12-05 PROCEDURE — 85025 COMPLETE CBC W/AUTO DIFF WBC: CPT | Performed by: PHYSICIAN ASSISTANT

## 2024-12-05 PROCEDURE — 258N000003 HC RX IP 258 OP 636: Performed by: STUDENT IN AN ORGANIZED HEALTH CARE EDUCATION/TRAINING PROGRAM

## 2024-12-05 PROCEDURE — 99233 SBSQ HOSP IP/OBS HIGH 50: CPT | Performed by: STUDENT IN AN ORGANIZED HEALTH CARE EDUCATION/TRAINING PROGRAM

## 2024-12-05 PROCEDURE — 84460 ALANINE AMINO (ALT) (SGPT): CPT | Performed by: PHYSICIAN ASSISTANT

## 2024-12-05 PROCEDURE — 87507 IADNA-DNA/RNA PROBE TQ 12-25: CPT | Performed by: INTERNAL MEDICINE

## 2024-12-05 PROCEDURE — 250N000011 HC RX IP 250 OP 636: Performed by: STUDENT IN AN ORGANIZED HEALTH CARE EDUCATION/TRAINING PROGRAM

## 2024-12-05 PROCEDURE — 86140 C-REACTIVE PROTEIN: CPT | Performed by: PHYSICIAN ASSISTANT

## 2024-12-05 PROCEDURE — 99291 CRITICAL CARE FIRST HOUR: CPT | Performed by: STUDENT IN AN ORGANIZED HEALTH CARE EDUCATION/TRAINING PROGRAM

## 2024-12-05 PROCEDURE — 250N000013 HC RX MED GY IP 250 OP 250 PS 637: Performed by: STUDENT IN AN ORGANIZED HEALTH CARE EDUCATION/TRAINING PROGRAM

## 2024-12-05 PROCEDURE — 87493 C DIFF AMPLIFIED PROBE: CPT | Performed by: INTERNAL MEDICINE

## 2024-12-05 PROCEDURE — 250N000013 HC RX MED GY IP 250 OP 250 PS 637: Performed by: PHYSICIAN ASSISTANT

## 2024-12-05 PROCEDURE — 84155 ASSAY OF PROTEIN SERUM: CPT | Performed by: PHYSICIAN ASSISTANT

## 2024-12-05 PROCEDURE — 82565 ASSAY OF CREATININE: CPT | Performed by: PHYSICIAN ASSISTANT

## 2024-12-05 PROCEDURE — 200N000001 HC R&B ICU

## 2024-12-05 PROCEDURE — 97165 OT EVAL LOW COMPLEX 30 MIN: CPT | Mod: GO

## 2024-12-05 PROCEDURE — 97535 SELF CARE MNGMENT TRAINING: CPT | Mod: GO

## 2024-12-05 PROCEDURE — 250N000009 HC RX 250: Performed by: PHYSICIAN ASSISTANT

## 2024-12-05 PROCEDURE — 82533 TOTAL CORTISOL: CPT | Performed by: STUDENT IN AN ORGANIZED HEALTH CARE EDUCATION/TRAINING PROGRAM

## 2024-12-05 PROCEDURE — 81001 URINALYSIS AUTO W/SCOPE: CPT | Performed by: PHYSICIAN ASSISTANT

## 2024-12-05 PROCEDURE — 258N000003 HC RX IP 258 OP 636: Performed by: PHYSICIAN ASSISTANT

## 2024-12-05 RX ORDER — NOREPINEPHRINE BITARTRATE 0.02 MG/ML
.01-.6 INJECTION, SOLUTION INTRAVENOUS CONTINUOUS
Status: DISCONTINUED | OUTPATIENT
Start: 2024-12-05 | End: 2024-12-06

## 2024-12-05 RX ORDER — ACETAMINOPHEN 325 MG/1
650 TABLET ORAL ONCE
Status: COMPLETED | OUTPATIENT
Start: 2024-12-05 | End: 2024-12-05

## 2024-12-05 RX ORDER — SODIUM CHLORIDE, SODIUM LACTATE, POTASSIUM CHLORIDE, CALCIUM CHLORIDE 600; 310; 30; 20 MG/100ML; MG/100ML; MG/100ML; MG/100ML
INJECTION, SOLUTION INTRAVENOUS CONTINUOUS
Status: ACTIVE | OUTPATIENT
Start: 2024-12-05 | End: 2024-12-05

## 2024-12-05 RX ORDER — ACETAMINOPHEN 325 MG/1
650 TABLET ORAL EVERY 4 HOURS PRN
Status: DISCONTINUED | OUTPATIENT
Start: 2024-12-05 | End: 2024-12-05

## 2024-12-05 RX ORDER — NICOTINE POLACRILEX 4 MG
15-30 LOZENGE BUCCAL
Status: DISCONTINUED | OUTPATIENT
Start: 2024-12-05 | End: 2024-12-06 | Stop reason: HOSPADM

## 2024-12-05 RX ORDER — AMOXICILLIN 250 MG
1-2 CAPSULE ORAL DAILY PRN
Status: DISCONTINUED | OUTPATIENT
Start: 2024-12-05 | End: 2024-12-06 | Stop reason: HOSPADM

## 2024-12-05 RX ORDER — LOPERAMIDE HYDROCHLORIDE 2 MG/1
2 CAPSULE ORAL 4 TIMES DAILY PRN
Status: DISCONTINUED | OUTPATIENT
Start: 2024-12-05 | End: 2024-12-06 | Stop reason: HOSPADM

## 2024-12-05 RX ORDER — ENOXAPARIN SODIUM 100 MG/ML
40 INJECTION SUBCUTANEOUS EVERY 24 HOURS
Status: DISCONTINUED | OUTPATIENT
Start: 2024-12-05 | End: 2024-12-06 | Stop reason: HOSPADM

## 2024-12-05 RX ORDER — GABAPENTIN 100 MG/1
100 CAPSULE ORAL 2 TIMES DAILY
Status: DISCONTINUED | OUTPATIENT
Start: 2024-12-05 | End: 2024-12-06 | Stop reason: HOSPADM

## 2024-12-05 RX ORDER — SODIUM CHLORIDE, SODIUM LACTATE, POTASSIUM CHLORIDE, CALCIUM CHLORIDE 600; 310; 30; 20 MG/100ML; MG/100ML; MG/100ML; MG/100ML
INJECTION, SOLUTION INTRAVENOUS CONTINUOUS
Status: DISCONTINUED | OUTPATIENT
Start: 2024-12-05 | End: 2024-12-05

## 2024-12-05 RX ORDER — DIVALPROEX SODIUM 125 MG/1
125 TABLET, DELAYED RELEASE ORAL 2 TIMES DAILY
Status: DISCONTINUED | OUTPATIENT
Start: 2024-12-05 | End: 2024-12-06 | Stop reason: HOSPADM

## 2024-12-05 RX ORDER — GUAIFENESIN 600 MG/1
1200 TABLET, EXTENDED RELEASE ORAL 2 TIMES DAILY
Status: DISCONTINUED | OUTPATIENT
Start: 2024-12-05 | End: 2024-12-06 | Stop reason: HOSPADM

## 2024-12-05 RX ORDER — SENNOSIDES 8.6 MG
650 CAPSULE ORAL 3 TIMES DAILY
Status: DISCONTINUED | OUTPATIENT
Start: 2024-12-05 | End: 2024-12-05

## 2024-12-05 RX ORDER — ACETAMINOPHEN 325 MG/1
650 TABLET ORAL 3 TIMES DAILY
Status: DISCONTINUED | OUTPATIENT
Start: 2024-12-05 | End: 2024-12-06 | Stop reason: HOSPADM

## 2024-12-05 RX ORDER — ONDANSETRON 2 MG/ML
4 INJECTION INTRAMUSCULAR; INTRAVENOUS EVERY 6 HOURS PRN
Status: DISCONTINUED | OUTPATIENT
Start: 2024-12-05 | End: 2024-12-06 | Stop reason: HOSPADM

## 2024-12-05 RX ORDER — PROCHLORPERAZINE MALEATE 5 MG/1
5 TABLET ORAL EVERY 6 HOURS PRN
Status: DISCONTINUED | OUTPATIENT
Start: 2024-12-05 | End: 2024-12-06 | Stop reason: HOSPADM

## 2024-12-05 RX ORDER — ONDANSETRON 4 MG/1
4 TABLET, ORALLY DISINTEGRATING ORAL EVERY 6 HOURS PRN
Status: DISCONTINUED | OUTPATIENT
Start: 2024-12-05 | End: 2024-12-06 | Stop reason: HOSPADM

## 2024-12-05 RX ORDER — NICOTINE 21 MG/24HR
1 PATCH, TRANSDERMAL 24 HOURS TRANSDERMAL DAILY
Status: DISCONTINUED | OUTPATIENT
Start: 2024-12-05 | End: 2024-12-06 | Stop reason: HOSPADM

## 2024-12-05 RX ORDER — DEXTROSE MONOHYDRATE 25 G/50ML
25-50 INJECTION, SOLUTION INTRAVENOUS
Status: DISCONTINUED | OUTPATIENT
Start: 2024-12-05 | End: 2024-12-06 | Stop reason: HOSPADM

## 2024-12-05 RX ORDER — ESCITALOPRAM OXALATE 10 MG/1
20 TABLET ORAL DAILY
Status: DISCONTINUED | OUTPATIENT
Start: 2024-12-05 | End: 2024-12-06 | Stop reason: HOSPADM

## 2024-12-05 RX ORDER — BENZONATATE 100 MG/1
100 CAPSULE ORAL 3 TIMES DAILY PRN
Status: DISCONTINUED | OUTPATIENT
Start: 2024-12-05 | End: 2024-12-06 | Stop reason: HOSPADM

## 2024-12-05 RX ORDER — METHYLPREDNISOLONE SODIUM SUCCINATE 125 MG/2ML
60 INJECTION INTRAMUSCULAR; INTRAVENOUS ONCE
Status: COMPLETED | OUTPATIENT
Start: 2024-12-05 | End: 2024-12-05

## 2024-12-05 RX ORDER — DEXTROMETHORPHAN POLISTIREX 30 MG/5ML
30 SUSPENSION ORAL
Status: DISCONTINUED | OUTPATIENT
Start: 2024-12-05 | End: 2024-12-06 | Stop reason: HOSPADM

## 2024-12-05 RX ORDER — ACETAMINOPHEN 650 MG/20.3ML
650 LIQUID ORAL EVERY 4 HOURS PRN
Status: DISCONTINUED | OUTPATIENT
Start: 2024-12-05 | End: 2024-12-05

## 2024-12-05 RX ORDER — LOPERAMIDE HYDROCHLORIDE 2 MG/1
2-4 CAPSULE ORAL 4 TIMES DAILY PRN
Status: DISCONTINUED | OUTPATIENT
Start: 2024-12-05 | End: 2024-12-05

## 2024-12-05 RX ORDER — HYDROCORTISONE SODIUM SUCCINATE 100 MG/2ML
50 INJECTION INTRAMUSCULAR; INTRAVENOUS EVERY 6 HOURS
Status: DISCONTINUED | OUTPATIENT
Start: 2024-12-05 | End: 2024-12-06

## 2024-12-05 RX ORDER — OSELTAMIVIR PHOSPHATE 75 MG/1
75 CAPSULE ORAL 2 TIMES DAILY
Status: DISCONTINUED | OUTPATIENT
Start: 2024-12-05 | End: 2024-12-06 | Stop reason: HOSPADM

## 2024-12-05 RX ADMIN — HYDROCORTISONE SODIUM SUCCINATE 50 MG: 100 INJECTION, POWDER, FOR SOLUTION INTRAMUSCULAR; INTRAVENOUS at 14:47

## 2024-12-05 RX ADMIN — NOREPINEPHRINE BITARTRATE 0.02 MCG/KG/MIN: 0.02 INJECTION, SOLUTION INTRAVENOUS at 17:27

## 2024-12-05 RX ADMIN — HYDROCORTISONE SODIUM SUCCINATE 50 MG: 100 INJECTION, POWDER, FOR SOLUTION INTRAMUSCULAR; INTRAVENOUS at 20:43

## 2024-12-05 RX ADMIN — ACETAMINOPHEN 650 MG: 325 TABLET ORAL at 13:40

## 2024-12-05 RX ADMIN — SODIUM CHLORIDE, POTASSIUM CHLORIDE, SODIUM LACTATE AND CALCIUM CHLORIDE 1000 ML: 600; 310; 30; 20 INJECTION, SOLUTION INTRAVENOUS at 01:28

## 2024-12-05 RX ADMIN — PANTOPRAZOLE SODIUM 40 MG: 40 INJECTION, POWDER, FOR SOLUTION INTRAVENOUS at 10:29

## 2024-12-05 RX ADMIN — NOREPINEPHRINE BITARTRATE 0.03 MCG/KG/MIN: 0.02 INJECTION, SOLUTION INTRAVENOUS at 11:05

## 2024-12-05 RX ADMIN — ENOXAPARIN SODIUM 40 MG: 40 INJECTION SUBCUTANEOUS at 14:47

## 2024-12-05 RX ADMIN — SODIUM CHLORIDE, POTASSIUM CHLORIDE, SODIUM LACTATE AND CALCIUM CHLORIDE: 600; 310; 30; 20 INJECTION, SOLUTION INTRAVENOUS at 13:41

## 2024-12-05 RX ADMIN — DIVALPROEX SODIUM 125 MG: 125 TABLET, DELAYED RELEASE ORAL at 20:44

## 2024-12-05 RX ADMIN — NICOTINE 1 PATCH: 14 PATCH, EXTENDED RELEASE TRANSDERMAL at 14:47

## 2024-12-05 RX ADMIN — OSELTAMIVIR PHOSPHATE 75 MG: 75 CAPSULE ORAL at 20:44

## 2024-12-05 RX ADMIN — NOREPINEPHRINE BITARTRATE 0.03 MCG/KG/MIN: 0.02 INJECTION, SOLUTION INTRAVENOUS at 15:38

## 2024-12-05 RX ADMIN — SODIUM CHLORIDE, POTASSIUM CHLORIDE, SODIUM LACTATE AND CALCIUM CHLORIDE 500 ML: 600; 310; 30; 20 INJECTION, SOLUTION INTRAVENOUS at 08:28

## 2024-12-05 RX ADMIN — ACETAMINOPHEN 650 MG: 325 TABLET ORAL at 05:39

## 2024-12-05 RX ADMIN — DIVALPROEX SODIUM 125 MG: 125 TABLET, DELAYED RELEASE ORAL at 09:15

## 2024-12-05 RX ADMIN — GABAPENTIN 100 MG: 100 CAPSULE ORAL at 20:44

## 2024-12-05 RX ADMIN — GUAIFENESIN 1200 MG: 600 TABLET ORAL at 09:07

## 2024-12-05 RX ADMIN — ACETAMINOPHEN 650 MG: 325 TABLET ORAL at 09:06

## 2024-12-05 RX ADMIN — GABAPENTIN 100 MG: 100 CAPSULE ORAL at 09:07

## 2024-12-05 RX ADMIN — METHYLPREDNISOLONE SODIUM SUCCINATE 62.5 MG: 125 INJECTION, POWDER, FOR SOLUTION INTRAMUSCULAR; INTRAVENOUS at 08:28

## 2024-12-05 RX ADMIN — SODIUM CHLORIDE, POTASSIUM CHLORIDE, SODIUM LACTATE AND CALCIUM CHLORIDE: 600; 310; 30; 20 INJECTION, SOLUTION INTRAVENOUS at 09:51

## 2024-12-05 RX ADMIN — OSELTAMIVIR PHOSPHATE 75 MG: 75 CAPSULE ORAL at 09:07

## 2024-12-05 RX ADMIN — GUAIFENESIN 1200 MG: 600 TABLET ORAL at 20:44

## 2024-12-05 RX ADMIN — ESCITALOPRAM OXALATE 20 MG: 10 TABLET ORAL at 09:07

## 2024-12-05 RX ADMIN — ACETAMINOPHEN 650 MG: 325 TABLET ORAL at 20:44

## 2024-12-05 ASSESSMENT — ACTIVITIES OF DAILY LIVING (ADL)
TOILETING: 1-->ASSISTANCE (EQUIPMENT/PERSON) NEEDED
CHANGE_IN_FUNCTIONAL_STATUS_SINCE_ONSET_OF_CURRENT_ILLNESS/INJURY: YES
ADLS_ACUITY_SCORE: 66
ADLS_ACUITY_SCORE: 67
DIFFICULTY_EATING/SWALLOWING: NO
ADLS_ACUITY_SCORE: 67
DRESSING/BATHING_DIFFICULTY: YES
ADLS_ACUITY_SCORE: 67
FALL_HISTORY_WITHIN_LAST_SIX_MONTHS: YES
ADLS_ACUITY_SCORE: 67
ADLS_ACUITY_SCORE: 65
DRESSING/BATHING: BATHING DIFFICULTY, DEPENDENT
TOILETING_ASSISTANCE: TOILETING DIFFICULTY, REQUIRES EQUIPMENT
ADLS_ACUITY_SCORE: 67
EQUIPMENT_CURRENTLY_USED_AT_HOME: WHEELCHAIR, MANUAL;WALKER, ROLLING
TOILETING: 1-->ASSISTANCE (EQUIPMENT/PERSON) NEEDED (NOT DEVELOPMENTALLY APPROPRIATE)
ADLS_ACUITY_SCORE: 70
ADLS_ACUITY_SCORE: 67
ADLS_ACUITY_SCORE: 63
ADLS_ACUITY_SCORE: 67
CONCENTRATING,_REMEMBERING_OR_MAKING_DECISIONS_DIFFICULTY: YES
ADLS_ACUITY_SCORE: 67
DOING_ERRANDS_INDEPENDENTLY_DIFFICULTY: YES
WALKING_OR_CLIMBING_STAIRS: TRANSFERRING DIFFICULTY, DEPENDENT
ADLS_ACUITY_SCORE: 67
ADLS_ACUITY_SCORE: 66
TOILETING_ISSUES: YES
ADLS_ACUITY_SCORE: 67
HEARING_DIFFICULTY_OR_DEAF: NO
ADLS_ACUITY_SCORE: 70
ADLS_ACUITY_SCORE: 67
ADLS_ACUITY_SCORE: 66
ADLS_ACUITY_SCORE: 65
ADLS_ACUITY_SCORE: 65
ADLS_ACUITY_SCORE: 66
WEAR_GLASSES_OR_BLIND: YES
DIFFICULTY_COMMUNICATING: NO
ADLS_ACUITY_SCORE: 62
WALKING_OR_CLIMBING_STAIRS_DIFFICULTY: YES
ADLS_ACUITY_SCORE: 65

## 2024-12-05 NOTE — ED NOTES
Daughter María Elena called and provided consent for PICC line insertion and for transfer to [facility].

## 2024-12-05 NOTE — PROGRESS NOTES
"[facility]    Medicine Progress Note - Hospitalist Service    Date of Admission:  12/4/2024    Assessment & Plan   Darren Atkinson is a 79 year old male admitted on 12/4/2024. He has a past medical history significant for dementia, anxiety, BPH, and history of subarachnoid hematoma who presented to the [facility] emergency department for evaluation of fever and confusion, was found to have acute hypoxic respiratory failure secondary to influenza A as well as shock for which he was directly admitted to [facility] ICU due to bed availability.    Acute respiratory failure with hypoxia   Influenza A  Presented with reports of cough, hypoxia (exact O2 reading not recorded), and fever Tmax 103 at his [facility]. VBG WNL (pH 7.42 / pCO2 41 / bicarb 27). Febrile in the emergency department, no leukocytosis (WBC 5.2), although hypotensive (see below), new 4L supplemental O2 need. Influenza A positive. CRP 59.    Chest x-ray (2 view) demonstrates \"Heart size is upper limits of normal for AP technique. Tortuous thoracic aorta. Lung volumes are diminished. Increased interstitial markings bilaterally may represent hypoventilatory change. No overt failure, lobar consolidation or large effusions. No acute bony abnormalities.\"     Hypoxic respiratory failure due to influenza, was started on oseltamivir 75 mg in the emergency department. Patient with stable oxygen requirements from overnight. Patient has baseline confusion that has been unchanged. Started steroids for hypoxia and   - Continue supplemental O2 to maintain sats > 91%, wean as tolerated  - Oseltamivir 75 mg bid   - Mucinex 1200 mg bid  - Prn tessalon and dextromethorphan available  - Supportive cares  - Incentive spirometry     Hypotension without shock, undifferentiated  Hypotensive to 83/50 in the emergency department, received 1.5L IV fluids and 25g albumin in the emergency department without sustained response. Was started on " "norepinephrine and a PICC was placed in the emergency department 12/4.     Occurs in the setting of known viral illness, but no obvious bacterial illness. Patient appears dry on exam. Clinically most consistent with septic shock (either viral although unusual, or unidentified bacterial source) vs severe hypovolemia (patient does report ongoing diarrhea, although this is unconfirmed), clinically not consistent with cardiogenic, anaphylactic, hemorrhagic shock, or other etiology. Appears dry on exam. Patient with a history of hypotension. Lactic acid within normal limits so no evidence of shock. Gave additional 500 ml bolus of LR and gave dose of solumedrol.   - Hydrocortisone 50 mg IV q6h  - LR at 100 ml/hr for 10 hours  - AM cortisol pending (screening)   - Continue norepinephrine infusion to maintain MAP > 65 mmHg, wean as able  - UA negative   - See below regarding possible diarrhea work-up    Reported diarrhea   Patient reports having diarrhea \"for 5 weeks,\" although he has dementia and report is questionable, not confirmed by any supporting documentation and family unavailable. Abdomen is non-tender. Does reside in long term care. Enteric panel and C. Diff negative.   - Monitor I/O's  - Loperamide PRN     Dementia   Oriented to self at baseline per emergency department notes. Resides in memory care. Apparently had altered mental status prior to emergency department arrival. Is alert, talkative, and provides coherent answers to questions at time of admission, although responses are questionable and inconsistent. Admission head CT negative for acute intracranial abnormality.   - Unclear if this is consistent with baseline mentation, will need to discuss with family in am   - Palliative care consult    Urinary retention  BPH (benign prostatic hyperplasia)  Known history of BPH per chart review, formerly followed with urology and was on tamsulosin but no records past 2021. No longer on BPH medications. Retaining " "urine with 450 ml on bladder scan when admitted.   - Bladder scan q6h, straight cath prn for > 350 ml retained urine  - Consider restarting tamsulosin     Generalized anxiety disorder  Agitation and restlessness  Slightly anxious at time of admission. Managed prior to admission with Lexapro 20 mg daily, gabapentin 100 mg bid, and Depakote 125 mg bid.   - Continue home medications    History of subarachnoid hemorrhage  Fall with head trauma in 2020, hospitalized in [place]. No apparent seizures, is on Depakote but indication is for restlessness/agitation and not seizures.           Diet: Advance Diet as Tolerated: Full Liquid Diet    DVT Prophylaxis: Enoxaparin (Lovenox) SQ  Vera Catheter: Not present  Lines: PRESENT      PICC 12/04/24 Triple Lumen Right Basilic Vasopressor,acute access-Site Assessment: WDL      Cardiac Monitoring: ACTIVE order. Indication: ICU  Code Status: No CPR- Do NOT Intubate      Clinically Significant Risk Factors Present on Admission                 # Thrombocytopenia: Lowest platelets = 122 in last 2 days, will monitor for bleeding     # Circulatory Shock: required vasopressors within past 24 hours      # Acute Hypoxic Respiratory Failure: Documented O2 saturation < 90%. Continue supplemental oxygen as needed  # Dementia: noted on problem list       # Overweight: Estimated body mass index is 26.05 kg/m  as calculated from the following:    Height as of this encounter: 1.727 m (5' 7.99\").    Weight as of this encounter: 77.7 kg (171 lb 4.8 oz).              Social Drivers of Health    Food Insecurity: Unknown (12/5/2024)    Food Insecurity     Within the past 12 months, did you worry that your food would run out before you got money to buy more?: Patient unable to answer     Within the past 12 months, did the food you bought just not last and you didn t have money to get more?: Patient unable to answer   Housing Stability: Unknown (12/5/2024)    Housing Stability     Do you have housing? " : Patient unable to answer     Are you worried about losing your housing?: Patient unable to answer   Tobacco Use: Medium Risk (12/4/2024)    Patient History     Smoking Tobacco Use: Former     Smokeless Tobacco Use: Never   Financial Resource Strain: Unknown (12/5/2024)    Financial Resource Strain     Within the past 12 months, have you or your family members you live with been unable to get utilities (heat, electricity) when it was really needed?: Patient unable to answer   Transportation Needs: Unknown (12/5/2024)    Transportation Needs     Within the past 12 months, has lack of transportation kept you from medical appointments, getting your medicines, non-medical meetings or appointments, work, or from getting things that you need?: Patient unable to answer    Received from [facility]    Social Connections          Disposition Plan     Medically Ready for Discharge: Anticipated in 2-4 Days         Agustín Alfaro DO  Hospitalist Service  [facility]  Securely message with [employer] (more info)  Text page via [employer] Paging/Directory   ______________________________________________________________________    Interval History   He required levophed overnight for hypotension that has been transient. He has required small doses to maintain adequate BP. Patient's oxygen requirements have been stable. Patient     Physical Exam   Vital Signs: Temp: 98.3  F (36.8  C) Temp src: Oral BP: 101/75 Pulse: 54   Resp: 18 SpO2: 94 % O2 Device: Nasal cannula Oxygen Delivery: 3 LPM  Weight: 171 lbs 4.76 oz    Constitutional: awake, alert x 1, cooperative, no apparent distress, and appears stated age  Respiratory: Crackles bibasilar, diminished breath sounds, no wheezing, comfortable on 3L NC  Cardiovascular: Normal apical impulse, regular rate and rhythm, normal S1 and S2, no S3 or S4, and no murmur noted  GI: No scars, normal bowel sounds, soft, non-distended, non-tender, no  masses palpated, no hepatosplenomegally  Skin: normal skin color, texture, turgor  Musculoskeletal: There is no redness, warmth, or swelling of the joints.  Full range of motion noted.  Motor strength is 5 out of 5 all extremities bilaterally.  Tone is normal.    Medical Decision Making       55 MINUTES SPENT BY ME on the date of service doing chart review, history, exam, documentation & further activities per the note.      Data     I have personally reviewed the following data over the past 24 hrs:    3.9 (L)  \   11.4 (L)   / 122 (L)     139 105 14.4 /  120 (H)   4.4 27 0.76 \     ALT: 20 AST: 28 AP: 79 TBILI: 0.4   ALB: 3.7 TOT PROTEIN: 5.8 (L) LIPASE: N/A     Procal: N/A CRP: 59.49 (H) Lactic Acid: 0.9         Imaging results reviewed over the past 24 hrs:   Recent Results (from the past 24 hours)   Head CT w/o contrast    Narrative    EXAM: CT HEAD W/O CONTRAST  LOCATION: [facility]  DATE: 12/4/2024    INDICATION: AMS  COMPARISON: None.  TECHNIQUE: Routine CT Head without IV contrast. Multiplanar reformats. Dose reduction techniques were used.    FINDINGS:  INTRACRANIAL CONTENTS: No acute intracranial hemorrhage. No hydrocephalus or extra-axial hemorrhage. Moderate ventriculomegaly and moderate global cortical volume loss with expected dilatation of the ventricular system. Intracranial atheromatous disease.    Remote ischemic findings in the inferior left temporal lobe predominantly posteriorly.    VISUALIZED ORBITS/SINUSES/MASTOIDS: No intraorbital abnormality. No paranasal sinus mucosal disease. No middle ear or mastoid effusion.    BONES/SOFT TISSUES: No acute abnormality.      Impression    IMPRESSION:  1.  No acute findings. Chronic changes as above.   XR Chest 2 Views    Narrative    EXAM: XR CHEST 2 VIEWS  LOCATION: [facility]  DATE: 12/4/2024    INDICATION: Fever  COMPARISON: 5/6/2019      Impression    IMPRESSION: Heart size is upper limits of normal  for AP technique. Tortuous thoracic aorta. Lung volumes are diminished. Increased interstitial markings bilaterally may represent hypoventilatory change. No overt failure, lobar consolidation or large   effusions. No acute bony abnormalities.

## 2024-12-05 NOTE — PHARMACY-ADMISSION MEDICATION HISTORY
Pharmacist Admission Medication History    Admission medication history is complete. The information provided in this note is only as accurate as the sources available at the time of the update.    Information Source(s): Facility ([facility]/) medication list/MAR via N/A    Pertinent Information: None    Changes made to PTA medication list:  Added: Tylenol CR, Depakote, Lexapro, gabapentin, Robitussin, loperamide, Fiber  Deleted: aspirin, meclizine, Miralax, dextromethorphan-guaifenesin, Tylenol  Changed: Senna S    Allergies reviewed with patient and updates made in EHR: no - no allergies listed on MAR    Medication History Completed By: Karen Azar RPH 12/4/2024 7:39 PM    PTA Med List   Medication Sig Last Dose/Taking    acetaminophen (TYLENOL 8 HOUR) 650 MG CR tablet Take 650 mg by mouth 3 times daily. 12/4/2024 Noon    divalproex sodium delayed-release (DEPAKOTE) 125 MG DR tablet Take 125 mg by mouth 2 times daily. For Anxiety 12/4/2024 Morning    escitalopram (LEXAPRO) 20 MG tablet Take 20 mg by mouth daily. 12/4/2024 Morning    gabapentin (NEURONTIN) 100 MG capsule Take 100 mg by mouth 2 times daily. 12/4/2024 Morning    guaiFENesin (ROBITUSSIN) 20 mg/mL liquid Take 200 mg by mouth every 4 hours as needed for cough. Unknown    loperamide (IMODIUM A-D) 2 MG tablet Take 2-4 mg by mouth 4 times daily as needed for diarrhea. Take 2 tablets after the first loose stool, followed by 1 tablet after each loose stool as needed 12/4/2024 Morning    senna-docusate (SENOKOT-S/PERICOLACE) 8.6-50 MG tablet Take 1-2 tablets by mouth daily as needed for constipation. Unknown    Vitafusion Fiber Well 2.5 g CHEW Take 2 chew tab by mouth 2 times daily. 12/4/2024 Morning

## 2024-12-05 NOTE — PLAN OF CARE
End Of Shift Note    Situation: Pt transferred from [facility] ED d/t increased confusion, fever Tmax 103.4 f, congested cough with exp wheezes, hypoxia 80% RA, diarrhea, myalgia's. Influenza A + with hypotension needing Norepi gtt.   Resident at [facility].     Plan: BP support, symptomatic support for Influenza, confusion    Subjective/Objective:    Neuro: A/Oriented to self only     Cardiac: Hypotension needing Norepi gtt which was paused shortly after arrival and additional IV fluids. NSR on monitor,     Resp: LS coarse with exp wheezes intermittently, Hypoxia to 80% RA, currently on 4 L/min NC to maintain O2sat >94%. Loose, congested cough present w/o production.     GI/: Loose stools with undigested food (corn), black in color - C diff, stool panel sent - may need Hemoccult also. Vomiting x1 after coughing.     Endo: BS WNL     MSK: Generalized weakness, attempted to stand with walker to urinate with assist x2 but unable to stand well.     Skin: Intact  several scabs to R hip    LDAs: PICC R upper arm

## 2024-12-05 NOTE — ED NOTES
Daughter's name is María Elena, phone number is [phone].  Is going home at this time, would like us to call with any pertinent updates once pt gets their CT scan.

## 2024-12-05 NOTE — PROGRESS NOTES
Dr. Kris lares paged at 0811 that patient hypotensive but MAP above 65.  Orders received.      Patient restless from start of shift and attempting to self transfer out of bed multiple times.  Alert to self only and does have disorganized thinking and inattention.  Wants to get back home to his computers and is frustrated with staff and wants to see his doctors from two months ago.  Having loose incontinent stool.  For safety, 1:1 nursing assistant at bedside around 1000.  Currently awake, alert and drinking coffee.

## 2024-12-05 NOTE — CONSULTS
Palliative Care Consultation Note  [facility]      Patient: Darren Atkinson  Date of Admission:  12/4/2024    Requesting Clinician / Team: Dr. Ponce  Reason for consult: Goals of care       Recommendations & Counseling     GOALS OF CARE:   Restorative with limits     ADVANCE CARE PLANNING:  Patient has an advance directive dated 7/31/2024.  Primary Health Care Agent daughter Anna.  Alternate(s) son Hal.   There is no POLST form on file, defer to patient and/or next of kin for decisions   Code status: No CPR- Do NOT Intubate    MEDICAL MANAGEMENT:   We are not actively managing symptoms at this time.    PSYCHOSOCIAL/SPIRITUAL SUPPORT:  Family 5 children, 21 grandchildren;  from wife due to his care needs after his SAH in 2022  Friends many but mostly back in [place], [place]  No spiritual health support needs identified.    Palliative Care will continue to follow remotely unless he has a decline. Thank you for the consult and allowing us to aid in the care of Darren Atkinson.    These recommendations have been discussed with Dr. Ponce via this note.    TTS: I spent a total of 60 minutes  on the date of service reviewing the medical record, consulting with the medical providers and assessing the patient, coordinating care, performing other activities listed above and completing documentation.    Tyree Robledo MD  [facility], Palliative Care  Securely message with the SpinNote Web Console (learn more here) or  Text page via [facility] Paging/Directory         Assessment      Darren Atkinson is a 79 year old male with a past medical history of Alzheimer's dementiawho presented on 12/4/2024 with influenza A and mental status changes.      Today, the patient was seen for:  Goals of Care--children are trying to sort out options for Darren especially as his confusion worsens.He often references his desire to not prolong his life when he is more lucid.    History of Present Illness  "  Met with Darren's daughter, María Elena.   I introduced our role as an extra layer of support and how we help patients and families dealing with serious, potentially life-limiting illnesses. I explained the composition of the palliative care team.  Palliative care helps patients and families navigate their care while focusing on the whole person; providing emotional, social and spiritual support  Palliative care often assists with symptom management, information sharing about what to expect from the illness, available treatment options and what effect those options may have on the disease course, and provide effective communication and caring support.    Darren has progressive dementia that accelerated after a closed head injury with SAH in 2022.  He came to hospital for infectious signs after eval at his snf yesterday and found to have Influenza A.  Darren's daughter was wondering about hospice for her dad and so I was consulted.    I had a dameon conversation with Darren Ring's [facility].  We discussed how setting limits around care works and I pointed out they already have set some such as no CPR or intubation/mechanical ventilation.  I also described at a high level other potential limits one might place on their care such as escalation of care to ICU status or the use of vasopressors; BiPAP, etc. I told María Elena the scenarios are endless and so it might be best to request a palliative care consult with any future in patient admissions.  Spontaneously, María Elena described how she decided about a PICC line and ICU last night. She said, \"I thought about what my dad would want and went with my gut.\"  I told that is exactly what we want HCA's to do.  She was relieved to know she'd done so well.    We also discussed hospice care and I told her Darren doesn't qualify at this time based on his FAST Scale score of 6c/d.  She agrees and thinks her dad has 1-2 years left (or more).  I did encourage her to find out what hospice agencies her " dad's shelter prefers to work with and see about an informational visit once he is discharged so she can learn more.    Prognosis, Goals, & Planning:   Functional Status just prior to this current hospitalization:  Outpatient Palliative Performance Score (PPS) 40%  Extensive disease. Mainly in bed w/little ambulation. ADLs/activities mainly w/assistance. Normal or reduced intake. Full LOC or drowsy or confused.     Prognosis, Goals, and/or Advance Care Planning:  Discussed what continuing restorative/life-prolonging care entails, including continued (re)admissions to the hospital, continuing with preventative and primary care, seeing disease/organ specific specialty consultations for medical treatments in hopes to prolong life for as long as possible.          Code Status was addressed today:   Yes, We discussed potential risks and rationale of attempting cardiac resuscitation, intubation, and mechanical ventilation.  We also discussed probability of survival as well as quality of life implications.  Based on this discussion, patient or surrogate response/decision: daughter confirmed his DNAR/DNI status.      Patient's decision making preferences: unable to assess        Patient has decision-making capacity today for complex decisions: Unreliable          Coping, Meaning, & Spirituality:   Mood, coping, and/or meaning in the context of serious illness were addressed today: Yes with daughter.    Social:   Living situation:resides in assisted living [facility] shelter  Important relationships/caregivers:3 daughters--2 are RNs; 1 son  Occupation:  for [employer]  Areas of fulfillment/[place]: inventing things, keeping busy outdoors; very handy with tools; gardening  Darren is 'famous for never letting the truth get in the way of a good story.'    Medications:  Reviewed this patient's medication profile and medications from this hospitalization. Notable medications:APAP; depakote; lexapro,  gabapentin;     [place] Board of Pharmacy Data Base Reviewed: Yes:   reviewed - no record of controlled substances prescribed.    ROS:  Comprehensive ROS is reviewed and is negative except as here & per HPI:     Physical Exam   Vital Signs with Ranges  Temp:  [98.3  F (36.8  C)-101.7  F (38.7  C)] 98.3  F (36.8  C)  Pulse:  [] 61  Resp:  [8-30] 24  BP: ()/(49-91) 95/57  SpO2:  [82 %-97 %] 94 %  Wt Readings from Last 10 Encounters:   12/05/24 77.7 kg (171 lb 4.8 oz)   12/04/24 84.9 kg (187 lb 3.2 oz)   02/05/24 72.1 kg (159 lb)   01/30/24 74.8 kg (165 lb)     171 lbs 4.76 oz    PHYSICAL EXAM:  Due to isolation precautions and Darren's confusion I greeted him from the door.  We chatted briefly and I found him to be pleasantly confused.      Data reviewed:  Results for orders placed or performed during the hospital encounter of 12/04/24 (from the past 24 hours)   Glucose by meter   Result Value Ref Range    GLUCOSE BY METER POCT 101 (H) 70 - 99 mg/dL   UA with Microscopic reflex to Culture    Specimen: Urine, Midstream   Result Value Ref Range    Color Urine Yellow Colorless, Straw, Light Yellow, Yellow    Appearance Urine Clear Clear    Glucose Urine Negative Negative mg/dL    Bilirubin Urine Negative Negative    Ketones Urine 5 (A) Negative mg/dL    Specific Gravity Urine 1.027 1.003 - 1.035    Blood Urine Negative Negative    pH Urine 5.0 5.0 - 7.0    Protein Albumin Urine Negative Negative mg/dL    Urobilinogen Urine Normal Normal, 2.0 mg/dL    Nitrite Urine Negative Negative    Leukocyte Esterase Urine Negative Negative    Mucus Urine Present (A) None Seen /LPF    RBC Urine 3 (H) <=2 /HPF    WBC Urine 1 <=5 /HPF    Squamous Epithelials Urine <1 <=1 /HPF    Narrative    Urine Culture not indicated   CBC with Platelets & Differential    Narrative    The following orders were created for panel order CBC with Platelets & Differential.  Procedure                               Abnormality         Status                      ---------                               -----------         ------                     CBC with platelets and d...[[id number]]  Abnormal            Final result                 Please view results for these tests on the individual orders.   Comprehensive metabolic panel   Result Value Ref Range    Sodium 139 135 - 145 mmol/L    Potassium 4.4 3.4 - 5.3 mmol/L    Carbon Dioxide (CO2) 27 22 - 29 mmol/L    Anion Gap 7 7 - 15 mmol/L    Urea Nitrogen 14.4 8.0 - 23.0 mg/dL    Creatinine 0.76 0.67 - 1.17 mg/dL    GFR Estimate >90 >60 mL/min/1.73m2    Calcium 8.7 (L) 8.8 - 10.4 mg/dL    Chloride 105 98 - 107 mmol/L    Glucose 93 70 - 99 mg/dL    Alkaline Phosphatase 79 40 - 150 U/L    AST 28 0 - 45 U/L    ALT 20 0 - 70 U/L    Protein Total 5.8 (L) 6.4 - 8.3 g/dL    Albumin 3.7 3.5 - 5.2 g/dL    Bilirubin Total 0.4 <=1.2 mg/dL   CRP inflammation   Result Value Ref Range    CRP Inflammation 59.49 (H) <5.00 mg/L   C. difficile Toxin B PCR with reflex to C. difficile Antigen and Toxins A/B EIA    Specimen: Per Rectum; Stool   Result Value Ref Range    C Difficile Toxin B by PCR Negative Negative    Narrative    The [employer] Xpert C. difficile Assay, performed on the [employer]  Instrument Systems, is a qualitative in vitro diagnostic test for rapid detection of toxin B gene sequences from unformed (liquid or soft) stool specimens collected from patients suspected of having Clostridioides difficile infection (CDI). The test utilizes automated real-time polymerase chain reaction (PCR) to detect toxin gene sequences associated with toxin producing C. difficile. The Xpert C. difficile Assay is intended as an aid in the diagnosis of CDI.   Enteric Bacteria and Virus Panel PCR    Specimen: Per Rectum; Stool   Result Value Ref Range    Campylobacter species Negative Negative    Salmonella species Negative Negative    Vibrio species Negative Negative    Vibrio cholerae Negative Negative    Yersinia  enterocolitica Negative Negative    Enteropathogenic E. coli (EPEC) Negative Negative, NA    Shiga-like toxin-producing E. coli (STEC) Negative Negative    Shigella/Enteroinvasive E. coli (EIEC) Negative Negative    Cryptosporidium species Negative Negative    Giardia lamblia Negative Negative    Norovirus Gl/Gll Negative Negative    Rotavirus A Negative Negative    Plesiomonas shigelloides Negative Negative    Enteroaggregative E. coli (EAEC) Negative Negative    Enterotoxigenic E. coli (ETEC) Negative Negative    E. coli O157 NA Negative, NA    Cyclospora cayetanensis Negative Negative    Entamoeba histolytica Negative Negative    Adenovirus F40/41 Negative Negative    Astrovirus Negative Negative    Sapovirus Negative Negative    Narrative    Assay performed using the FDA-cleared [employer]uring [employer] GI Panel from [employer]  A negative result should not rule out infection in patients with a probability for gastrointestinal infection. The assay does not test for all potential infectious agents of diarrheal disease.  Positive results do not distinguish between a viable or replicating organism and the presence of a nonviable organism or nucleic acid, nor do they exclude the possibility of coinfection by organisms not in the panel.  Results are intended to aid in the diagnosis of illness and are meant to be used in conjunction with other clinical findings.  This test has been verified and is performed by the Infect[facility] Diagnostic Laboratory at [facility]. This laboratory is certified under the Clinical Laboratory Improvement Amendments of 1988 (CLIA-88) as qualified to perform high complexity clinical laboratory testing.   CBC with platelets and differential   Result Value Ref Range    WBC Count 3.9 (L) 4.0 - 11.0 10e3/uL    RBC Count 3.63 (L) 4.40 - 5.90 10e6/uL    Hemoglobin 11.4 (L) 13.3 - 17.7 g/dL    Hematocrit 34.8 (L) 40.0 - 53.0 %    MCV 96 78 - 100 fL    MCH 31.4 26.5 - 33.0 pg    MCHC  32.8 31.5 - 36.5 g/dL    RDW 13.0 10.0 - 15.0 %    Platelet Count 122 (L) 150 - 450 10e3/uL    % Neutrophils 71 %    % Lymphocytes 13 %    % Monocytes 15 %    % Eosinophils 0 %    % Basophils 1 %    % Immature Granulocytes 1 %    NRBCs per 100 WBC 0 <1 /100    Absolute Neutrophils 2.8 1.6 - 8.3 10e3/uL    Absolute Lymphocytes 0.5 (L) 0.8 - 5.3 10e3/uL    Absolute Monocytes 0.6 0.0 - 1.3 10e3/uL    Absolute Eosinophils 0.0 0.0 - 0.7 10e3/uL    Absolute Basophils 0.0 0.0 - 0.2 10e3/uL    Absolute Immature Granulocytes 0.0 <=0.4 10e3/uL    Absolute NRBCs 0.0 10e3/uL   Glucose by meter   Result Value Ref Range    GLUCOSE BY METER POCT 120 (H) 70 - 99 mg/dL

## 2024-12-05 NOTE — CONSULTS
See palliative care consultation note just filed.  Tyree Robledo MD MS FAAFP    [facility]  Office [phone]  Fax [phone]

## 2024-12-05 NOTE — PROCEDURES
"PICC Line Insertion Procedure Note  Pt. Name: Darren Atkinson  MRN:        [id number]    Procedure: Insertion of a  triple Lumen  5 fr  Bard SOLO (valved) Power PICC, Lot number [id number]    Indications: Vasopressor    Contraindications : none    Procedure Details     Patient identified with 2 identifiers and \"Time Out\" conducted.  .     Central line insertion bundle followed: hand hygiene performed prior to procedure, site cleansed with cholraprep, hat, mask, sterile gloves, sterile gown worn, patient draped with maximum barrier head to toe drape, sterile field maintained.    The vein was assessed and found to be compressible and of adequate size.     Lidocaine 1% 2 ml administered sq to the insertion site. A 5 Fr PICC was inserted into the basilic vein of the right arm with ultrasound guidance. 1 attempt(s) required to access vein.   Catheter threaded without difficulty. Good blood return noted.    Modified Seldinger Technique used for insertion.    The 8 sharps that are included in the PICC insertion kit were accounted for and disposed of in the sharps container prior to breakdown of the sterile field.    Catheter secured with Statlock, biopatch and Tegaderm dressing applied.    Findings:    Total catheter length  41 cm, with 0 cm exposed. Mid upper arm circumference is 30 cm. Catheter was flushed with 30 cc NS. Patient  tolerated procedure well.    Tip placement verified by 3CG technology. Tip placement in the SVC.    CLABSI prevention brochure left at bedside.    Patient's primary RN notified PICC is ready for use.      Comments:          Adeola SYKESCstat RN      "

## 2024-12-05 NOTE — H&P
"[facility]    History and Physical - Hospitalist Service       Date of Admission:  12/4/2024    Assessment & Plan      Darren Atkinson is a 79 year old male admitted on 12/4/2024. He has a past medical history significant for dementia, anxiety, BPH, and history of subarachnoid hematoma who presented to the [facility] emergency department for evaluation of fever and confusion, was found to have acute hypoxic respiratory failure secondary to influenza A as well as shock for which he was directly admitted to [facility] ICU due to bed availability.    Acute respiratory failure with hypoxia   Influenza A  Presented with reports of cough, hypoxia (exact O2 reading not recorded), and fever Tmax 103 at his [facility]. VBG WNL (pH 7.42 / pCO2 41 / bicarb 27). Febrile in the emergency department, no leukocytosis (WBC 5.2), although hypotensive (see below), new 4L supplemental O2 need. Influenza A positive.    Chest x-ray (2 view) demonstrates \"Heart size is upper limits of normal for AP technique. Tortuous thoracic aorta. Lung volumes are diminished. Increased interstitial markings bilaterally may represent hypoventilatory change. No overt failure, lobar consolidation or large effusions. No acute bony abnormalities.\"    Hypoxic respiratory failure due to influenza, was started on oseltamivir 75 mg in the emergency department.  - Continue supplemental O2 to maintain sats > 91%  - Oseltamivir 75 mg bid   - Mucinex 1200 mg bid  - Prn tessalon and dextromethorphan available  - Supportive cares  - Incentive spirometry     Shock, undifferentiated  Hypotensive to 83/50 in the emergency department, received 1.5L IV fluids and 25g albumin in the emergency department without sustained response. Was started on norepinephrine and a PICC was placed in the emergency department 12/4.     Occurs in the setting of known viral illness, but no obvious bacterial illness. Patient appears dry on exam. Clinically " "most consistent with septic shock (either viral although unusual, or unidentified bacterial source) vs severe hypovolemia (patient does report ongoing diarrhea, although this is unconfirmed), clinically not consistent with cardiogenic, anaphylactic, hemorrhagic shock, or other etiology. Appears dry on exam.   - Admit to ICU on norepinephrine, wean as able  - Patient did not receive full 30 ml/kg sepsis resuscitation; give additional 1L LR bolus  - If no response to fluid bolus, obtain chest CT to better evaluate for pulmonary etiology of shock & hypoxemia  - UA pending  - See below regarding possible diarrhea work-up    Reported diarrhea   Patient reports having diarrhea \"for 5 weeks,\" although he has dementia and report is questionable, not confirmed by any supporting documentation and family unavailable. Abdomen is non-tender. Does reside in long term care.   - Monitor for diarrhea; if present, send enteric panel and C.diff    Dementia   Oriented to self at baseline per emergency department notes. Resides in memory care. Apparently had altered mental status prior to emergency department arrival. Is alert, talkative, and provides coherent answers to questions at time of admission, although responses are questionable and inconsistent.   Admission head CT negative for acute intracranial abnormality.   - Unclear if this is consistent with baseline mentation, will need to discuss with family in am     Urinary retention  BPH (benign prostatic hyperplasia)  Known history of BPH per chart review, formerly followed with urology and was on tamsulosin but no records past 2021. No longer on BPH medications. Retaining urine with 450 ml on bladder scan when admitted.   - Bladder scan q6h, straight cath prn for > 350 ml retained urine  - Consider restarting tamsulosin     Generalized anxiety disorder  Agitation and restlessness  Slightly anxious at time of admission. Managed prior to admission with Lexapro 20 mg daily, " "gabapentin 100 mg bid, and Depakote 125 mg bid.   - Continue home medications    History of subarachnoid hemorrhage  Fall with head trauma in 2020, hospitalized in [place]. No apparent seizures, is on Depakote but indication is for restlessness / agitation and not seizures.             Diet: Advance Diet as Tolerated: Clear Liquid Diet  DVT Prophylaxis: Pneumatic Compression Devices  Vera Catheter: Not present  Lines: PRESENT             Cardiac Monitoring: ACTIVE order. Indication: ICU  Code Status: No CPR- Do NOT Intubate - reviewed healthcare directive on file, which states DNR/DNI    Clinically Significant Risk Factors Present on Admission                       # Dementia: noted on problem list       # Overweight: Estimated body mass index is 26.05 kg/m  as calculated from the following:    Height as of this encounter: 1.727 m (5' 7.99\").    Weight as of this encounter: 77.7 kg (171 lb 4.8 oz).              Disposition Plan     Medically Ready for Discharge: Anticipated in 2-4 Days         The patient's care was discussed with the Attending Physician, Dr. Quentin Michel, bedside RN, and Patient.    Jayda De Leon PA-C  Hospitalist Service  [facility]  Securely message with [employer] (more info)  Text page via [facility] Paging/Directory     ______________________________________________________________________    Chief Complaint   \"I have diarrhea.\"    History is obtained from the patient (who is a questionable historian), review of EMR, and emergency department documentation.     History of Present Illness   Darren Atkinson is a 79 year old male with a past medical history significant for dementia, anxiety, BPH, and history of subarachnoid hematoma who presented to the [facility] emergency department for evaluation of fever and confusion, was found to have acute hypoxic respiratory failure secondary to influenza A as well as shock for which he was directly admitted to [facility] " "ICU due to bed availability.    Patient is alert and talkative, but is a questionable historian. No family is present at time of admission to provide ancillary history.     Report from the emergency department is that he was noted to have a fever of 103 and altered mental status today at his [facility], and was not acting himself.  EMS was called and he was found to be hypoxic and brought to the [facility] emergency department, where he was found to also be hypotensive.  Work-up was significant for influenza A, but no obvious cause for the hypotension.   He did not respond to 1.5L IV fluids and albumin, so norepinephrine was started and a PICC was placed.  No ICU beds were available at [facility] so patient was directly admitted to [facility] ICU.     Patient is able to recognize that he is in the hospital, but is unable to accurately state why; he thinks it is because he fell.  When told that he has influenza he states \"that makes sense, because I've had diarrhea for 5 weeks\" (which was not reported as part of the history in the emergency department).   He reports associated abdominal cramping, but no nausea or vomiting (even though he did vomit once on arrival).   He is unable to put together a coherent story explaining recent events and why he was brought to the hospital.    When asked yes or no symptom questions, he does report having recent chills, body aches, fatigue, and nasal congestion, but he does not known when this started.   No reported cough or shortness of breath, but does report wheezing.  Says he was feeling very dizzy this morning.   Reports poor sleep, but good appetite.   Complains that he needs to urinate, but is unable to void at bedside in a urinal.   Reported diarrhea and abdominal cramping as noted above.  The remainder review of systems is negative.       Past Medical History    Past Medical History:   Diagnosis Date    Elevated prostate specific antigen (PSA)     Hip fracture " (H) 2023    Memory loss     Subarachnoid hematoma (H) 12/2021       Past Surgical History   Past Surgical History:   Procedure Laterality Date    ARTHROPLASTY HIP Right 2/5/2024    Procedure: complex right total hip arthroplasty - posterior approach;  Surgeon: Matt Trinh MD;  Location: [facility] OR    BACK SURGERY      PICC TRIPLE LUMEN PLACEMENT  12/4/2024       Prior to Admission Medications   Prior to Admission Medications   Prescriptions Last Dose Informant Patient Reported? Taking?   Vitafusion Fiber Well 2.5 g CHEW   Yes No   Sig: Take 2 chew tab by mouth 2 times daily.   acetaminophen (TYLENOL 8 HOUR) 650 MG CR tablet   Yes No   Sig: Take 650 mg by mouth 3 times daily.   divalproex sodium delayed-release (DEPAKOTE) 125 MG DR tablet   Yes No   Sig: Take 125 mg by mouth 2 times daily. For Anxiety   escitalopram (LEXAPRO) 20 MG tablet   Yes No   Sig: Take 20 mg by mouth daily.   gabapentin (NEURONTIN) 100 MG capsule   Yes No   Sig: Take 100 mg by mouth 2 times daily.   guaiFENesin (ROBITUSSIN) 20 mg/mL liquid   Yes No   Sig: Take 200 mg by mouth every 4 hours as needed for cough.   loperamide (IMODIUM A-D) 2 MG tablet   Yes No   Sig: Take 2-4 mg by mouth 4 times daily as needed for diarrhea. Take 2 tablets after the first loose stool, followed by 1 tablet after each loose stool as needed   senna-docusate (SENOKOT-S/PERICOLACE) 8.6-50 MG tablet   Yes No   Sig: Take 1-2 tablets by mouth daily as needed for constipation.      Facility-Administered Medications: None        Review of Systems    The 10 point Review of Systems is negative other than noted in the HPI or here.     Social History   I have reviewed this patient's social history and updated it with pertinent information if needed.  Social History     Tobacco Use    Smoking status: Former     Current packs/day: 0.00     Average packs/day: 1 pack/day for 50.0 years (50.0 ttl pk-yrs)     Types: Cigarettes     Start date: 1969     Quit date: 2019      Years since quittin.9    Smokeless tobacco: Never   Substance Use Topics    Alcohol use: Not Currently    Drug use: Never         Family History     Unable to obtain due to: patient confusion     Physical Exam   Vital Signs: Temp: 98.3  F (36.8  C) Temp src: Oral BP: 119/73 Pulse: 83   Resp: 22 SpO2: 96 % O2 Device: Nasal cannula Oxygen Delivery: 3 LPM  Weight: 171 lbs 4.76 oz    Constitutional: Alert, cooperative, talkative. Oriented to self, location (hospital), year (but not full date), and reported that Daniella was president (although technically president elect). No apparent distress, appears nontoxic. Speaking in full sentences, although sometimes incoherent and questionable responses.     Eyes: Eyes are clear, pupils are reactive. No scleral icterus.    HEENT: Oropharynx is clear and moist, no lesions. Normocephalic, no evidence of cranial trauma.      Cardiovascular: Regular rhythm and rate, normal S1 and S2. No murmur, rubs, or gallops. Peripheral pulses intact bilaterally. No lower extremity edema.    Respiratory: Non-labored breathing on 4L supplemental O2. Coarse cough provoked by deep inspiration. Fine bibasilar crackles at bases, otherwise lung sounds are clear to auscultation bilaterally without wheezes or rhonchi.    GI: Soft, non-distended. Non-tender, no rebound or guarding. No hepatosplenomegaly or masses appreciated. Normal bowel sounds.     Musculoskeletal: Without obvious deformity, normal range of motion. Normal muscle bulk and tone. Distal CMS intact.      Skin: Warm and dry, no rashes or ecchymoses. No mottling of skin.      Neurologic: Patient moves all extremities. Gross strength and sensation are equal bilaterally.    Genitourinary: Deferred      Medical Decision Making       95 MINUTES SPENT BY ME on the date of service doing chart review, history, exam, documentation & further activities per the note.  MANAGEMENT DISCUSSED with the following over the past 24 hours: Dr. Quentin Michel    NOTE(S)/MEDICAL RECORDS REVIEWED over the past 24 hours: hospitalization Dec 2020 / Jan 2021, emergency department notes 12/4/24  Tests ORDERED & REVIEWED in the past 24 hours:  - BMP  - CBC  - Lactic Acid  - VBG  - COVID / influenza / RSV PCR      Data     I have personally reviewed the following data over the past 24 hrs:    5.3  \   12.7 (L)   / 168     139 106 18.7 /  101 (H)   4.4 25 0.82 \     ALT: 21 AST: 25 AP: 94 TBILI: 0.4   ALB: 4.0 TOT PROTEIN: 6.5 LIPASE: N/A     Procal: N/A CRP: N/A Lactic Acid: 0.9         Imaging results reviewed over the past 24 hrs:   Recent Results (from the past 24 hours)   Head CT w/o contrast    Narrative    EXAM: CT HEAD W/O CONTRAST  LOCATION: [facility]  DATE: 12/4/2024    INDICATION: AMS  COMPARISON: None.  TECHNIQUE: Routine CT Head without IV contrast. Multiplanar reformats. Dose reduction techniques were used.    FINDINGS:  INTRACRANIAL CONTENTS: No acute intracranial hemorrhage. No hydrocephalus or extra-axial hemorrhage. Moderate ventriculomegaly and moderate global cortical volume loss with expected dilatation of the ventricular system. Intracranial atheromatous disease.    Remote ischemic findings in the inferior left temporal lobe predominantly posteriorly.    VISUALIZED ORBITS/SINUSES/MASTOIDS: No intraorbital abnormality. No paranasal sinus mucosal disease. No middle ear or mastoid effusion.    BONES/SOFT TISSUES: No acute abnormality.      Impression    IMPRESSION:  1.  No acute findings. Chronic changes as above.   XR Chest 2 Views    Narrative    EXAM: XR CHEST 2 VIEWS  LOCATION: [facility]  DATE: 12/4/2024    INDICATION: Fever  COMPARISON: 5/6/2019      Impression    IMPRESSION: Heart size is upper limits of normal for AP technique. Tortuous thoracic aorta. Lung volumes are diminished. Increased interstitial markings bilaterally may represent hypoventilatory change. No overt failure, lobar consolidation or  large   effusions. No acute bony abnormalities.

## 2024-12-05 NOTE — PROGRESS NOTES
TELE ICU Progress Note          Assessment and Plan:     Darren Atkinson is a 79 year old patient being cared for in the ICU for Influenza A with acute hypoxic respiratory failure and hypotension. Pertinent assessment and recommendations include:  Neurology: Possibly with some degree of ICU delirium, has sitter.   Cardiovascular / Hemodynamics: Hypotension in setting of Influenza. Lactate on admission 0.9, no signs of end organ lack of perfusion.Receiving continuous IV fluids and additional fluid bolus for drop in pressures this morning. Diarrhea prior to admission as below and likely needed some volume resuscitation but would not want to over resuscitate. Review of prior records shows BPs often 100/60s so may not be far from baseline.    If continued low pressures may need restart of Norepi and would obtain Echo for additional assessment of fluid status and EF. If in need of stress dose steroids would use hydrocortisone 50 mg Q6H.     Update, patient placed back on norepi late morning.      Pulmonary: Acute hypoxic respiratory failure in setting of Influenza A.   No focal infiltrates on CXR. Normal VBG yesterday.   Weaning oxygen as tolerated, currently on 3L.   Agree with O2 sat goal of 89-93%     GI and Nutrition: Diarrhea, working up with enteric panel.   Renal, Fluid and Electrolytes: Volume management as above.    Infectious Disease: Influenza A positive on Tamiflu. If clinical status declines would start empiric CAP coverage.   Blood culture pending.   Hematology and Oncology: Leukopenia this morning, likely dilutional following fluids and albumin yesterday but would watch as potential sign of worsening sepsis. Continue to trend CBC.   Endocrinology: Glucose monitoring   Intensive Care: Central line: No central line present  Arterial line: No arterial line present  Restraint:None  Evra catheter: Bladder scan, watching for retention   DVT prophylaxis: SCDs  GI prophylaxis: On a PPI   Primary Team  Communication: Via bedside nurse and note   Billing: Total critical care time spent by me, excluding procedures, was 40 minutes.      Lissette Olivera MD  2024           Hospital Course and Key events     In the last 24 hours: Transferred from [facility] ED to [facility] for ICU care. Presented with dyspnea, AMS, fever, acute hypoxic respiratory failure requiring 4L O2. Found positive for Influenza A. Hypotensive overnight requiring fluids and norepi infusion that held this morning. Recurrent hypotension this morning treated with additional fluids and IV steroids. Seen on video monitor this morning. Able to sit up in bed and drink coffee, oxygen weaned. BP at that time 87/68 after additional fluid bolus and dose of IV methylpred. Oxygen weaned to 3L.            Medications:     I have reviewed this patient's current medications  Current Facility-Administered Medications   Medication Dose Route Frequency Provider Last Rate Last Admin    lactated ringers infusion   Intravenous Continuous Agustín Ponce  mL/hr at 24 0951 New Bag at 24 0951    norepinephrine (LEVOPHED) 4 mg in  mL infusion PREMIX  0.01-0.6 mcg/kg/min Intravenous Continuous Jayda De Leon PA-C 9.6 mL/hr at 24 1105 0.03 mcg/kg/min at 24 1105          Vitals and Exam:       Vital Sign Ranges  Temperature Temp  Av.8  F (37.7  C)  Min: 98.3  F (36.8  C)  Max: 101.7  F (38.7  C)   Blood pressure Systolic (24hrs), Av , Min:68 , Max:137        Diastolic (24hrs), Av, Min:49, Max:91      Pulse Pulse  Av.7  Min: 58  Max: 101   Respirations Resp  Av.8  Min: 8  Max: 30   Pulse oximetry SpO2  Av %  Min: 82 %  Max: 97 %       I/O    Intake/Output Summary (Last 24 hours) at 2024 1110  Last data filed at 2024 0800  Gross per 24 hour   Intake 240 ml   Output 350 ml   Net -110 ml       Resp: 14    Physical Examination:   I examined this patient remotely using the telemedicine camera in  the [facility]. The patient is located at [facility]    General Appearance:   Sitting up in bed, comfortable appearing   HEENT:   Nasal cannula is present     Respiratory:   No respiratory distress Able to speak in full sentences     Neuro:   Oriented to self, does require a sitter   Other             Pertinent Data:     All pertinent labs and diagnostic studies were reviewed    Labs:  CMP  Recent Labs   Lab 12/05/24  0547 12/04/24  2335 12/04/24  1745     --  139   POTASSIUM 4.4  --  4.4   CHLORIDE 105  --  106   CO2 27  --  25   ANIONGAP 7  --  8   GLC 93 101* 104*   BUN 14.4  --  18.7   CR 0.76  --  0.82   GFRESTIMATED >90  --  89   ROMAIN 8.7*  --  8.8   PROTTOTAL 5.8*  --  6.5   ALBUMIN 3.7  --  4.0   BILITOTAL 0.4  --  0.4   ALKPHOS 79  --  94   AST 28  --  25   ALT 20  --  21     CBC  Recent Labs   Lab 12/05/24  0547 12/04/24  1745   WBC 3.9* 5.3   RBC 3.63* 3.99*   HGB 11.4* 12.7*   HCT 34.8* 37.1*   MCV 96 93   MCH 31.4 31.8   MCHC 32.8 34.2   RDW 13.0 13.1   * 168     INRNo lab results found in last 7 days.  Arterial Blood Gas  Recent Labs   Lab 12/04/24  1745   O2PER 22     Lactic Acid, Initial   Date Value Ref Range Status   12/04/2024 0.9 0.7 - 2.0 mmol/L Final       Imaging:  Head CT w/o contrast  Narrative: EXAM: CT HEAD W/O CONTRAST  LOCATION: [facility]  DATE: 12/4/2024    INDICATION: AMS  COMPARISON: None.  TECHNIQUE: Routine CT Head without IV contrast. Multiplanar reformats. Dose reduction techniques were used.    FINDINGS:  INTRACRANIAL CONTENTS: No acute intracranial hemorrhage. No hydrocephalus or extra-axial hemorrhage. Moderate ventriculomegaly and moderate global cortical volume loss with expected dilatation of the ventricular system. Intracranial atheromatous disease.    Remote ischemic findings in the inferior left temporal lobe predominantly posteriorly.    VISUALIZED ORBITS/SINUSES/MASTOIDS: No intraorbital  abnormality. No paranasal sinus mucosal disease. No middle ear or mastoid effusion.    BONES/SOFT TISSUES: No acute abnormality.  Impression: IMPRESSION:  1.  No acute findings. Chronic changes as above.  XR Chest 2 Views  Narrative: EXAM: XR CHEST 2 VIEWS  LOCATION: [facility]  DATE: 12/4/2024    INDICATION: Fever  COMPARISON: 5/6/2019  Impression: IMPRESSION: Heart size is upper limits of normal for AP technique. Tortuous thoracic aorta. Lung volumes are diminished. Increased interstitial markings bilaterally may represent hypoventilatory change. No overt failure, lobar consolidation or large   effusions. No acute bony abnormalities.

## 2024-12-05 NOTE — PROGRESS NOTES
Hypotensive  68/58 MAP 63  Norepinephrine started.  Patient calm and resting during reading.  Awake, drinking coffee and socializing with sitter.    Dr. Ponce updated on current blood pressure reading but now patient is back to 98/77 MAP 88.    Daughter Anna arrived and reports patient typically 90/60's at baseline.

## 2024-12-05 NOTE — PROGRESS NOTES
"   12/05/24 1400   Appointment Info   Signing Clinician's Name / Credentials (OT) Amparo Morrow, OTR/L   Living Environment   People in Home alone   Current Living Arrangements apartment  (Per chart review MCU. Per RN who spoke with daughter just normal residential.)   Living Environment Comments Pt with difficulty answering PLOF questions but does state he lives in an apartment. Describes walking down to meals, however now has a scooter for long distances.   Self-Care   Fall history within last six months yes   Number of times patient has fallen within last six months 5   Activity/Exercise/Self-Care Comment Per RN- spoke with daughter. At baseline Pt is independent with all ADLs using FWW for mobility. Only uses scooter to go down to meals d/t LE pain with walking long distances. Appears to be active at baseline. Stating \"I exercise a lot. From 4PM to midnight\".   General Information   Onset of Illness/Injury or Date of Surgery 12/04/24   Referring Physician Agustín Ponce,    Patient/Family Therapy Goal Statement (OT) open to working with therapy   Additional Occupational Profile Info/Pertinent History of Current Problem Darren Atkinson is a 79 year old male admitted on 12/4/2024. He has a past medical history significant for dementia, anxiety, BPH, and history of subarachnoid hematoma who presented to the [facility] emergency department for evaluation of fever and confusion, was found to have acute hypoxic respiratory failure secondary to influenza A as well as shock for which he was directly admitted to [facility] ICU due to bed availability.   Existing Precautions/Restrictions fall   Cognitive Status Examination   Orientation Status person;place  (Pt able to state Hospital as place.)   Cognitive Status Comments Very talkative and pleasantly confused. At times difficulty redirecting patient to tasks during therapy treatment. distractible.   Pain Assessment   Patient Currently in Pain No   Strength Comprehensive " (MMT)   Comment, General Manual Muscle Testing (MMT) Assessment generalized weakness.   Transfers   Transfer Comments Min A for sit to stand using FWW   Balance   Balance Comments Ambulates with CGA and FWW. Second person for safety as pt is very distractible. decreased insight and hx of low BP.   Activities of Daily Living   BADL Assessment/Intervention lower body dressing;upper body dressing;toileting   Upper Body Dressing Assessment/Training   [place] Level (Upper Body Dressing) set up   Lower Body Dressing Assessment/Training   [place] Level (Lower Body Dressing) minimum assist (75% patient effort)   Clinical Impression   Criteria for Skilled Therapeutic Interventions Met (OT) Yes, treatment indicated   OT Diagnosis decreased independence/safety with ADLs and related mobility   OT Problem List-Impairments impacting ADL problems related to;activity tolerance impaired;cognition;strength   Assessment of Occupational Performance 3-5 Performance Deficits   Identified Performance Deficits dressing, toileting, showering   Planned Therapy Interventions (OT) ADL retraining;strengthening;progressive activity/exercise   Clinical Decision Making Complexity (OT) problem focused assessment/low complexity   Risk & Benefits of therapy have been explained patient;participants included;participants voiced agreement with care plan;risks/benefits reviewed;current/potential barriers reviewed;care plan/treatment goals reviewed;evaluation/treatment results reviewed   OT Total Evaluation Time   OT Eval, Low Complexity Minutes (68430) 10   OT Goals   Therapy Frequency (OT) 6 times/week   OT Predicted Duration/Target Date for Goal Attainment 12/12/24   OT Goals Hygiene/Grooming;Lower Body Dressing;Toilet Transfer/Toileting   OT: Hygiene/Grooming supervision/stand-by assist;while standing   OT: Lower Body Dressing Supervision/stand-by assist   OT: Toilet Transfer/Toileting Supervision/stand-by assist   Interventions    Interventions Quick Adds Self-Care/Home Management   Self-Care/Home Management   Self-Care/Home Mgmt/ADL, Compensatory, Meal Prep Minutes (34305) 10   Symptoms Noted During/After Treatment (Meal Preparation/Planning Training) fatigue   Treatment Detail/Skilled Intervention to increase strength/activity tolerance in prep for home. Pt participates in functional mobility within room. Ambulates to end of bed and back with CGA and FWW. Stands total of x3 min with CGA and FWW. easily distracted and needs cues to redirect. Pt left seated with all needs within reach, chair alarm on and 1:1 sitter present.   OT Discharge Planning   OT Plan POC Thurs 1/6; toilet transfer, LB dressing, standing g/h   OT Discharge Recommendation (DC Rec) Transitional Care Facility   OT Rationale for DC Rec Today would recommend TCU as pt is needing Ax1-2 for safety, however may be able to progress to home pending LOS and cognitive improvement.   OT Brief overview of current status Ax1 for ADLs and related mobility. second person present d/t decreased insight and hx of low BP.   Total Session Time   Timed Code Treatment Minutes 10   Total Session Time (sum of timed and untimed services) 20

## 2024-12-05 NOTE — ED NOTES
Bed: JNED-09  Expected date: 12/4/24  Expected time:   Means of arrival:   Comments:  PICC PLACEMENT

## 2024-12-06 VITALS
TEMPERATURE: 97.5 F | WEIGHT: 172.62 LBS | OXYGEN SATURATION: 93 % | RESPIRATION RATE: 20 BRPM | BODY MASS INDEX: 26.16 KG/M2 | DIASTOLIC BLOOD PRESSURE: 70 MMHG | HEIGHT: 68 IN | SYSTOLIC BLOOD PRESSURE: 109 MMHG | HEART RATE: 55 BPM

## 2024-12-06 LAB
ANION GAP SERPL CALCULATED.3IONS-SCNC: 8 MMOL/L (ref 7–15)
BUN SERPL-MCNC: 15.7 MG/DL (ref 8–23)
CALCIUM SERPL-MCNC: 9 MG/DL (ref 8.8–10.4)
CHLORIDE SERPL-SCNC: 103 MMOL/L (ref 98–107)
CORTIS SERPL-MCNC: 97.8 UG/DL
CREAT SERPL-MCNC: 0.69 MG/DL (ref 0.67–1.17)
CRP SERPL-MCNC: 49.66 MG/L
EGFRCR SERPLBLD CKD-EPI 2021: >90 ML/MIN/1.73M2
ERYTHROCYTE [DISTWIDTH] IN BLOOD BY AUTOMATED COUNT: 12.8 % (ref 10–15)
GLUCOSE SERPL-MCNC: 114 MG/DL (ref 70–99)
HCO3 SERPL-SCNC: 27 MMOL/L (ref 22–29)
HCT VFR BLD AUTO: 33.3 % (ref 40–53)
HGB BLD-MCNC: 11.2 G/DL (ref 13.3–17.7)
MAGNESIUM SERPL-MCNC: 1.9 MG/DL (ref 1.7–2.3)
MCH RBC QN AUTO: 31.1 PG (ref 26.5–33)
MCHC RBC AUTO-ENTMCNC: 33.6 G/DL (ref 31.5–36.5)
MCV RBC AUTO: 93 FL (ref 78–100)
PLATELET # BLD AUTO: 114 10E3/UL (ref 150–450)
POTASSIUM SERPL-SCNC: 4 MMOL/L (ref 3.4–5.3)
RBC # BLD AUTO: 3.6 10E6/UL (ref 4.4–5.9)
SODIUM SERPL-SCNC: 138 MMOL/L (ref 135–145)
WBC # BLD AUTO: 5.3 10E3/UL (ref 4–11)

## 2024-12-06 PROCEDURE — 83735 ASSAY OF MAGNESIUM: CPT | Performed by: STUDENT IN AN ORGANIZED HEALTH CARE EDUCATION/TRAINING PROGRAM

## 2024-12-06 PROCEDURE — 85014 HEMATOCRIT: CPT | Performed by: STUDENT IN AN ORGANIZED HEALTH CARE EDUCATION/TRAINING PROGRAM

## 2024-12-06 PROCEDURE — 250N000013 HC RX MED GY IP 250 OP 250 PS 637: Performed by: STUDENT IN AN ORGANIZED HEALTH CARE EDUCATION/TRAINING PROGRAM

## 2024-12-06 PROCEDURE — 250N000009 HC RX 250: Performed by: STUDENT IN AN ORGANIZED HEALTH CARE EDUCATION/TRAINING PROGRAM

## 2024-12-06 PROCEDURE — 84520 ASSAY OF UREA NITROGEN: CPT | Performed by: STUDENT IN AN ORGANIZED HEALTH CARE EDUCATION/TRAINING PROGRAM

## 2024-12-06 PROCEDURE — 250N000013 HC RX MED GY IP 250 OP 250 PS 637: Performed by: PHYSICIAN ASSISTANT

## 2024-12-06 PROCEDURE — 80048 BASIC METABOLIC PNL TOTAL CA: CPT | Performed by: STUDENT IN AN ORGANIZED HEALTH CARE EDUCATION/TRAINING PROGRAM

## 2024-12-06 PROCEDURE — 250N000011 HC RX IP 250 OP 636: Performed by: STUDENT IN AN ORGANIZED HEALTH CARE EDUCATION/TRAINING PROGRAM

## 2024-12-06 PROCEDURE — 86140 C-REACTIVE PROTEIN: CPT | Performed by: STUDENT IN AN ORGANIZED HEALTH CARE EDUCATION/TRAINING PROGRAM

## 2024-12-06 PROCEDURE — 99239 HOSP IP/OBS DSCHRG MGMT >30: CPT | Performed by: STUDENT IN AN ORGANIZED HEALTH CARE EDUCATION/TRAINING PROGRAM

## 2024-12-06 RX ORDER — MIDODRINE HYDROCHLORIDE 5 MG/1
5 TABLET ORAL
Status: DISCONTINUED | OUTPATIENT
Start: 2024-12-06 | End: 2024-12-06 | Stop reason: HOSPADM

## 2024-12-06 RX ORDER — MIDODRINE HYDROCHLORIDE 5 MG/1
5 TABLET ORAL
Qty: 90 TABLET | Refills: 0 | Status: SHIPPED | OUTPATIENT
Start: 2024-12-06 | End: 2024-12-06

## 2024-12-06 RX ORDER — HALOPERIDOL 5 MG/ML
2 INJECTION INTRAMUSCULAR EVERY 6 HOURS PRN
Status: DISCONTINUED | OUTPATIENT
Start: 2024-12-06 | End: 2024-12-06 | Stop reason: HOSPADM

## 2024-12-06 RX ORDER — OSELTAMIVIR PHOSPHATE 75 MG/1
75 CAPSULE ORAL 2 TIMES DAILY
Qty: 6 CAPSULE | Refills: 0 | Status: SHIPPED | OUTPATIENT
Start: 2024-12-06 | End: 2024-12-06

## 2024-12-06 RX ORDER — MIDODRINE HYDROCHLORIDE 5 MG/1
5 TABLET ORAL
Qty: 90 TABLET | Refills: 0 | Status: SHIPPED | OUTPATIENT
Start: 2024-12-06

## 2024-12-06 RX ORDER — HYDROCORTISONE SODIUM SUCCINATE 100 MG/2ML
50 INJECTION INTRAMUSCULAR; INTRAVENOUS 2 TIMES DAILY
Status: DISCONTINUED | OUTPATIENT
Start: 2024-12-06 | End: 2024-12-06

## 2024-12-06 RX ORDER — QUETIAPINE FUMARATE 25 MG/1
25 TABLET, FILM COATED ORAL 2 TIMES DAILY PRN
Status: DISCONTINUED | OUTPATIENT
Start: 2024-12-06 | End: 2024-12-06 | Stop reason: HOSPADM

## 2024-12-06 RX ORDER — OSELTAMIVIR PHOSPHATE 75 MG/1
75 CAPSULE ORAL 2 TIMES DAILY
Qty: 6 CAPSULE | Refills: 0 | Status: SHIPPED | OUTPATIENT
Start: 2024-12-06

## 2024-12-06 RX ORDER — QUETIAPINE FUMARATE 25 MG/1
25 TABLET, FILM COATED ORAL 2 TIMES DAILY PRN
Qty: 60 TABLET | Refills: 0 | Status: SHIPPED | OUTPATIENT
Start: 2024-12-06

## 2024-12-06 RX ADMIN — OSELTAMIVIR PHOSPHATE 75 MG: 75 CAPSULE ORAL at 08:37

## 2024-12-06 RX ADMIN — ACETAMINOPHEN 650 MG: 325 TABLET ORAL at 08:37

## 2024-12-06 RX ADMIN — ESCITALOPRAM OXALATE 20 MG: 10 TABLET ORAL at 08:37

## 2024-12-06 RX ADMIN — HYDROCORTISONE SODIUM SUCCINATE 50 MG: 100 INJECTION, POWDER, FOR SOLUTION INTRAMUSCULAR; INTRAVENOUS at 02:23

## 2024-12-06 RX ADMIN — GABAPENTIN 100 MG: 100 CAPSULE ORAL at 08:37

## 2024-12-06 RX ADMIN — PANTOPRAZOLE SODIUM 40 MG: 40 INJECTION, POWDER, FOR SOLUTION INTRAVENOUS at 08:45

## 2024-12-06 RX ADMIN — DIVALPROEX SODIUM 125 MG: 125 TABLET, DELAYED RELEASE ORAL at 08:39

## 2024-12-06 RX ADMIN — ACETAMINOPHEN 650 MG: 325 TABLET ORAL at 13:39

## 2024-12-06 RX ADMIN — ENOXAPARIN SODIUM 40 MG: 40 INJECTION SUBCUTANEOUS at 13:40

## 2024-12-06 RX ADMIN — GUAIFENESIN 1200 MG: 600 TABLET ORAL at 08:38

## 2024-12-06 RX ADMIN — NICOTINE 1 PATCH: 14 PATCH, EXTENDED RELEASE TRANSDERMAL at 08:37

## 2024-12-06 RX ADMIN — LOPERAMIDE HYDROCHLORIDE 2 MG: 2 CAPSULE ORAL at 08:38

## 2024-12-06 RX ADMIN — LOPERAMIDE HYDROCHLORIDE 2 MG: 2 CAPSULE ORAL at 12:35

## 2024-12-06 RX ADMIN — QUETIAPINE FUMARATE 25 MG: 25 TABLET ORAL at 12:35

## 2024-12-06 RX ADMIN — MIDODRINE HYDROCHLORIDE 5 MG: 5 TABLET ORAL at 13:39

## 2024-12-06 RX ADMIN — HALOPERIDOL LACTATE 2 MG: 5 INJECTION, SOLUTION INTRAMUSCULAR at 11:53

## 2024-12-06 RX ADMIN — HYDROCORTISONE SODIUM SUCCINATE 50 MG: 100 INJECTION, POWDER, FOR SOLUTION INTRAMUSCULAR; INTRAVENOUS at 08:44

## 2024-12-06 RX ADMIN — MIDODRINE HYDROCHLORIDE 5 MG: 5 TABLET ORAL at 08:38

## 2024-12-06 RX ADMIN — BENZONATATE 100 MG: 100 CAPSULE ORAL at 08:37

## 2024-12-06 ASSESSMENT — ACTIVITIES OF DAILY LIVING (ADL)
ADLS_ACUITY_SCORE: 67

## 2024-12-06 NOTE — DISCHARGE SUMMARY
"[facility]  Hospitalist Discharge Summary      Date of Admission:  12/4/2024  Date of Discharge:  12/6/2024  Discharging Provider: Agustín Alfaro DO  Discharge Service: Hospitalist Service    Discharge Diagnoses   Acute respiratory failure with hypoxia   Influenza A  Hypotension without shock, undifferentiated   Reported diarrhea  Dementia   Urinary retention  BPH (benign prostatic hyperplasia)  Generalized anxiety disorder  Agitation and restlessness   History of subarachnoid hemorrhage     Clinically Significant Risk Factors     # Overweight: Estimated body mass index is 26.25 kg/m  as calculated from the following:    Height as of this encounter: 1.727 m (5' 7.99\").    Weight as of this encounter: 78.3 kg (172 lb 9.9 oz).       Follow-ups Needed After Discharge     Unresulted Labs Ordered in the Past 30 Days of this Admission       Date and Time Order Name Status Description    12/5/2024  1:10 PM Cortisol In process     12/4/2024  5:05 PM Blood Culture Peripheral Blood Preliminary         These results will be followed up by PCP.     Discharge Disposition   Discharged to assisted living  Condition at discharge: Good    Hospital Course   Darren Atkinson is a 79 year old male admitted on 12/4/2024. He has a past medical history significant for dementia, anxiety, BPH, and history of subarachnoid hematoma who presented to the [facility] emergency department for evaluation of fever and confusion, was found to have acute hypoxic respiratory failure secondary to influenza A as well as shock for which he was directly admitted to [facility] ICU due to bed availability.    Acute respiratory failure with hypoxia   Influenza A  Presented with reports of cough, hypoxia (exact O2 reading not recorded), and fever Tmax 103 at his [facility]. VBG WNL (pH 7.42 / pCO2 41 / bicarb 27). Febrile in the emergency department, no leukocytosis (WBC 5.2), although hypotensive (see below), new 4L supplemental " "O2 need. Influenza A positive. CRP 59.    Chest x-ray (2 view) demonstrates \"Heart size is upper limits of normal for AP technique. Tortuous thoracic aorta. Lung volumes are diminished. Increased interstitial markings bilaterally may represent hypoventilatory change. No overt failure, lobar consolidation or large effusions. No acute bony abnormalities.\"     Hypoxic respiratory failure due to influenza, was started on oseltamivir 75 mg in the emergency department. Patient with stable oxygen requirements from overnight. Patient has baseline confusion that has been unchanged. Started steroids for hypoxia and   - Oseltamivir 75 mg bid for 5 day duration    Hypotension without shock, undifferentiated  Hypotensive to 83/50 in the emergency department, received 1.5L IV fluids and 25g albumin in the emergency department without sustained response. Was started on norepinephrine and a PICC was placed in the emergency department 12/4.     Occurs in the setting of known viral illness, but no obvious bacterial illness. Patient appears dry on exam. Clinically most consistent with septic shock (either viral although unusual, or unidentified bacterial source) vs severe hypovolemia (patient does report ongoing diarrhea, although this is unconfirmed), clinically not consistent with cardiogenic, anaphylactic, hemorrhagic shock, or other etiology. Appears dry on exam. Patient with a history of hypotension. Lactic acid within normal limits so no evidence of shock. Gave additional 500 ml bolus of LR and gave dose of solumedrol. AM cortisol not accurate given they added it to afternoon blood draw following a dose of hydrocortisone.   - Initiated midodrine 5 mg TID to mitigate orthostatic hypotension and fall risk    Reported diarrhea - resolved  Patient reports having diarrhea \"for 5 weeks,\" although he has dementia and report is questionable, not confirmed by any supporting documentation and family unavailable. Abdomen is non-tender. " Does reside in long term care. Enteric panel and C. Diff negative.     Dementia   Oriented to self at baseline per emergency department notes. Resides in memory care. Apparently had altered mental status prior to emergency department arrival. Is alert, talkative, and provides coherent answers to questions at time of admission, although responses are questionable and inconsistent. Admission head CT negative for acute intracranial abnormality.   - Palliative care consulted    Urinary retention  BPH (benign prostatic hyperplasia)  Known history of BPH per chart review, formerly followed with urology and was on tamsulosin but no records past 2021. No longer on BPH medications. Retaining urine with 450 ml on bladder scan when admitted.   - No acute concerns while hospitalized     Generalized anxiety disorder  Agitation and restlessness  Slightly anxious at time of admission. Managed prior to admission with Lexapro 20 mg daily, gabapentin 100 mg bid, and Depakote 125 mg bid.   - Continue home medications    History of subarachnoid hemorrhage  Fall with head trauma in 2020, hospitalized in [place]. No apparent seizures, is on Depakote but indication is for restlessness/agitation and not seizures.     Consultations This Hospital Stay   OCCUPATIONAL THERAPY ADULT IP CONSULT  PHYSICAL THERAPY ADULT IP CONSULT  CARE MANAGEMENT / SOCIAL WORK IP CONSULT  PALLIATIVE CARE ADULT IP CONSULT  PHYSICAL THERAPY ADULT IP CONSULT  OCCUPATIONAL THERAPY ADULT IP CONSULT    Code Status   No CPR- Do NOT Intubate    Time Spent on this Encounter   I, Agustín Alfaro DO, personally saw the patient today and spent greater than 30 minutes discharging this patient.       Agustín Alfaro DO  [facility]  [address] [address]  Phone: [phone]  Fax: [phone]  ______________________________________________________________________    Physical Exam   Vital Signs: Temp: 98.3  F (36.8  C) Temp src:  Axillary BP: 120/84 Pulse: 55   Resp: 24 SpO2: 94 % O2 Device: None (Room air) Oxygen Delivery: 1 LPM  Weight: 172 lbs 9.92 oz    Constitutional: awake, alert x 1, uncooperative, no apparent distress, and appears stated age  Respiratory: Crackles bibasilar, diminished breath sounds, no wheezing, comfortable on room air  Cardiovascular: Normal apical impulse, regular rate and rhythm, normal S1 and S2, no S3 or S4, and no murmur noted  GI: No scars, normal bowel sounds, soft, non-distended, non-tender, no masses palpated, no hepatosplenomegally  Skin: normal skin color, texture, turgor  Musculoskeletal: There is no redness, warmth, or swelling of the joints.  Full range of motion noted.  Motor strength is 5 out of 5 all extremities bilaterally.  Tone is normal       Primary Care Physician   [facility]    Discharge Orders      General info for SNF    Length of Stay Estimate: Long Term Care  Condition at Discharge: Stable  Level of care:board and care  Rehabilitation Potential: Fair  Admission H&P remains valid and up-to-date: Yes  Recent Chemotherapy: N/A  Use Nursing Home Standing Orders: Yes     Reason for your hospital stay    Influenza PNA, sepsis without shock, acute respiratory failure with hypoxia, hypotension     Activity - Up with nursing assistance     Physical Therapy Adult Consult    Evaluate and treat as clinically indicated.    Reason:  Generalized weakness, progressive deconditioning     Occupational Therapy Adult Consult    Evaluate and treat as clinically indicated.    Reason:  Generalized weakness, progressive deconditioning     Droplet Isolation     Fall precautions     Diet    Follow this diet upon discharge:    Advance Diet as Tolerated: Regular Diet Adult       Significant Results and Procedures   Most Recent 3 CBC's:  Recent Labs   Lab Test 12/06/24  0623 12/05/24  0547 12/04/24  1745   WBC 5.3 3.9* 5.3   HGB 11.2* 11.4* 12.7*   MCV 93 96 93   * 122* 168     Most Recent 3  BMP's:  Recent Labs   Lab Test 12/06/24  0623 12/05/24  1623 12/05/24  1234 12/05/24  0547 12/04/24  2335 12/04/24  1745     --   --  139  --  139   POTASSIUM 4.0  --   --  4.4  --  4.4   CHLORIDE 103  --   --  105  --  106   CO2 27  --   --  27  --  25   BUN 15.7  --   --  14.4  --  18.7   CR 0.69  --   --  0.76  --  0.82   ANIONGAP 8  --   --  7  --  8   ROMAIN 9.0  --   --  8.7*  --  8.8   * 141* 120* 93   < > 104*    < > = values in this interval not displayed.     Most Recent ESR & CRP:  Recent Labs   Lab Test 12/06/24 0623   CRPI 49.66*   ,   Results for orders placed or performed during the hospital encounter of 12/04/24   XR Chest 2 Views    Narrative    EXAM: XR CHEST 2 VIEWS  LOCATION: [facility]  DATE: 12/4/2024    INDICATION: Fever  COMPARISON: 5/6/2019      Impression    IMPRESSION: Heart size is upper limits of normal for AP technique. Tortuous thoracic aorta. Lung volumes are diminished. Increased interstitial markings bilaterally may represent hypoventilatory change. No overt failure, lobar consolidation or large   effusions. No acute bony abnormalities.   Head CT w/o contrast    Narrative    EXAM: CT HEAD W/O CONTRAST  LOCATION: [facility]  DATE: 12/4/2024    INDICATION: AMS  COMPARISON: None.  TECHNIQUE: Routine CT Head without IV contrast. Multiplanar reformats. Dose reduction techniques were used.    FINDINGS:  INTRACRANIAL CONTENTS: No acute intracranial hemorrhage. No hydrocephalus or extra-axial hemorrhage. Moderate ventriculomegaly and moderate global cortical volume loss with expected dilatation of the ventricular system. Intracranial atheromatous disease.    Remote ischemic findings in the inferior left temporal lobe predominantly posteriorly.    VISUALIZED ORBITS/SINUSES/MASTOIDS: No intraorbital abnormality. No paranasal sinus mucosal disease. No middle ear or mastoid effusion.    BONES/SOFT TISSUES: No acute abnormality.       Impression    IMPRESSION:  1.  No acute findings. Chronic changes as above.       Discharge Medications   Current Discharge Medication List        START taking these medications    Details   midodrine (PROAMATINE) 5 MG tablet Take 1 tablet (5 mg) by mouth 3 times daily (with meals).  Qty: 90 tablet, Refills: 0    Associated Diagnoses: Hypotension, unspecified hypotension type      oseltamivir (TAMIFLU) 75 MG capsule Take 1 capsule (75 mg) by mouth 2 times daily for 3 days.  Qty: 6 capsule, Refills: 0    Associated Diagnoses: Influenza A           CONTINUE these medications which have NOT CHANGED    Details   acetaminophen (TYLENOL 8 HOUR) 650 MG CR tablet Take 1 tablet by mouth 3 times daily.      divalproex sodium delayed-release (DEPAKOTE) 125 MG DR tablet Take 1 tablet by mouth 2 times daily. For Anxiety      escitalopram (LEXAPRO) 20 MG tablet Take 1 tablet by mouth daily.      gabapentin (NEURONTIN) 100 MG capsule Take 1 capsule by mouth 2 times daily.      guaiFENesin (ROBITUSSIN) 20 mg/mL liquid Take 200 mg by mouth every 4 hours as needed for cough.      loperamide (IMODIUM A-D) 2 MG tablet Take 2-4 mg by mouth 4 times daily as needed for diarrhea. Take 2 tablets after the first loose stool, followed by 1 tablet after each loose stool as needed      senna-docusate (SENOKOT-S/PERICOLACE) 8.6-50 MG tablet Take 1-2 tablets by mouth daily as needed for constipation.      Vitafusion Fiber Well 2.5 g CHEW Take 2 chew tab by mouth 2 times daily.           Allergies   Allergies   Allergen Reactions    Morphine Hallucination    Oxycodone Hallucination

## 2024-12-06 NOTE — PLAN OF CARE
End Of Shift Note      Subjective/Objective:    Neuro: alert to self.  Required 1:1 sitter mid morning and this continues due to impulsivity and he also removes blood pressure cuff.  Gets restless with BP readings and constantly talks with his hands, nursing assistant assisting with keeping his arm still for accurate readings when needed.  Patient talks non-stop with his hands animating but is pleasant and appropriate.  Has a hard time following directions at times and gets frustrated and anxious with staff.  Wants his wheelchair and supplies from home.  Wants to go home.     Cardiac: sinus rhythm to sinus juliette.  Currently back on norepinephrine drip and every 15 minute BP's.  Has LR infusing at 100/hour which stops at 2000.    Resp: Currently on 0.5 lpm nasal cannula to maintain goal. Lungs diminished and a little course.  He is working on incentive spirometer well with the prompting of 1:1 bedside attendant.     GI/: bowel movements x 2 this shift.  Brown in color and some blackness noted in pm stool per nursing assistant.  Patient started protonix today.  Incontinent at times of both urine and stool at times.      Endo: blood sugars every 4 hours.  Stable.     MSK: up with assist of two, transfer belt and walker.  Sat in chair most of the afternoon    Skin: blanchable redness to bilateral heels and buttocks.    On scheduled tylenol, talks about chronic pain to right leg.  Appears controlled and denies pain when asked.     LDAs: triple lumen PICC    Problem: Risk for Delirium  Goal: Improved Attention and Thought Clarity  12/5/2024 1815 by Katy Contreras, RN  Outcome: Not Progressing  12/5/2024 1814 by Katy Contreras, RN  Outcome: Progressing  Intervention: Maximize Cognitive Function  Recent Flowsheet Documentation  Taken 12/5/2024 1604 by Katy Contreras, RN  Sensory Stimulation Regulation:   quiet environment promoted   care clustered   auditory stimulation minimized  Reorientation Measures:   clock in view    reorientation provided  Taken 12/5/2024 1158 by Katy Contreras, RN  Sensory Stimulation Regulation:   quiet environment promoted   care clustered   auditory stimulation minimized  Reorientation Measures:   clock in view   reorientation provided  Taken 12/5/2024 0800 by Katy Contreras, RN  Sensory Stimulation Regulation:   quiet environment promoted   care clustered   auditory stimulation minimized  Reorientation Measures:   clock in view   reorientation provided       Goal Outcome Evaluation:      Plan of Care Reviewed With: patient, child    Overall Patient Progress: improvingOverall Patient Progress: improving

## 2024-12-06 NOTE — PROGRESS NOTES
Pt now raising fist at staff, threatening to hurt them and grabbing hand/arm then squeezing as hard as pt can. Pt not redirectable. Constant rambling of nonsensical words.  Updated MD.  Updated daughter María Elena.    Mima Rodriguez RN BSN

## 2024-12-06 NOTE — PROGRESS NOTES
"Care Management Discharge Note    Addendum    Discharge Date: 12/06/2024     Discharge Disposition: Return back to the Assisted Living     Pt will Transition to Memory Care Unit at [facility] next week once apt has been modified and pt has been assess by Lillian the RN       Discharge Transportation: Family     Private pay costs discussed: Not applicable    Does the patient's insurance plan have a 3 day qualifying hospital stay waiver?  No     Patient/family educated on Medicare website which has current facility and service quality ratings: No     Education Provided on the Discharge Plan:  Yes  Persons Notified of Discharge Plans: Yakelin Ring and Anna  Patient/Family in Agreement with the Plan: yes    Handoff Referral Completed: Yes, non-[facility] PCP: External handoff communication completed    Additional Information:  Update to the discharge plan     Extensive calls and planning between the Pt's family and the DORI Snyder at the [facility] # [phone]  Daughter María Elena and Daughter Anna have decided they would prefer to have the patient back in his own DEE apartment today  And provided 24 hour supervision vs keeping the patient in the hospital and having him assessed by Lillian for Memory Care placement.     Family aware that Memory Care could take several days to accomplish and the Pt is not comfortable in the hospital. Wanting to discharge back to his apartment. Getting agitated and uncooperative with staff per EMR.       Family has elected to discharge back to the DEE with the Patient  Daughter Anna will be staying with the Patient 24 hours/day     DORI Snyder stated they will continue to work with the Family to establish a transfer plan for Memory Care placement. Scheduled to have new carpet installed early next week in the unit. Family requesting to have the \"exact apartment style\" as the pt currently has in the DEE to help with further confusion.     Discharge Plan: Return back to the Assisted Living " with Family provided 24 hour supervision     --then transition to the Memory Care unit at [facility] once apartment is open/available     [place] [address]  # [phone]  DORI Snyder # [phone]  Fax # [phone]    Transportation : Family       JOSE ROBERTO Ferrer  [facility] [phone]   [facility]  [phone]

## 2024-12-06 NOTE — PROGRESS NOTES
"FYI page to MD Re;  Pt argumentative with all attempted cares, uncooperative, swearing, refusing medications or breakfast. Pulled out PICC line, when laying underneath blanket. Pt with constant glaring look at staff. \"I feel fine\", repeated multiple times. Pt maintaining O2 sats on RA, LS with crackles. Incontinent in brief, large amount, removed brief himself while laying in bed, new brief applied. Let MD know, pt removing all monitoring equipment, so no cardiac monitor on currently.  Took sips of water & milk, attempted to give PO meds again with charge nurse, pt refusing. Keeps referring to \"russians\" and \"being run over\".    Mima Rodriguez RN BSN    "

## 2024-12-06 NOTE — CONSULTS
Care Management Initial Consult    General Information  Assessment completed with: VM-chart review, Children, Daughter María Elena  Type of CM/SW Visit: Offer D/C Planning    Primary Care Provider verified and updated as needed: Yes ([facility])   Readmission within the last 30 days:        Reason for Consult: discharge planning  Advance Care Planning: Advance Care Planning Reviewed: present on chart          Communication Assessment  Patient's communication style: spoken language (English or Bilingual)    Hearing Difficulty or Deaf: no   Wear Glasses or Blind: yes    Cognitive  Cognitive/Neuro/Behavioral: .WDL except, mood/behavior, orientation, level of consciousness  Level of Consciousness: confused  Arousal Level: opens eyes spontaneously  Orientation: disoriented to, place, time, situation  Mood/Behavior: agitated, uncooperative  Best Language: 0 - No aphasia  Speech: clear, logical    Living Environment:   People in home: alone     Current living Arrangements: assisted living  Name of Facility: [facility]     Able to return to prior arrangements: no : Needing Assist of 1-2 people  Living Arrangement Comments: Lives in Assisted Living--Not Memory Care    Family/Social Support:  Care provided by: DEE resident at [facility]  Provides care for:    Marital Status:   Support system: Children          Description of Support System: Supportive, Involved    Support Assessment: Lacks adequate physical care, Lacks necessary supervision and assistance    Current Resources:   Patient receiving home care services:  No  Community Resources: N/A   Equipment currently used at home: walker, rolling, other (see comments) (electric scooter)  Supplies currently used at home:      Employment/Financial:  Employment Status: retired        Financial Concerns:        Does the patient's insurance plan have a 3 day qualifying hospital stay waiver?  No    Lifestyle & Psychosocial Needs:  Social  Drivers of Health     Food Insecurity: Unknown (12/5/2024)    Food Insecurity     Within the past 12 months, did you worry that your food would run out before you got money to buy more?: Patient unable to answer     Within the past 12 months, did the food you bought just not last and you didn t have money to get more?: Patient unable to answer   Depression: Not at risk (1/30/2024)    PHQ-2     PHQ-2 Score: 2   Housing Stability: Unknown (12/5/2024)    Housing Stability     Do you have housing? : Patient unable to answer     Are you worried about losing your housing?: Patient unable to answer   Tobacco Use: Medium Risk (12/4/2024)    Patient History     Smoking Tobacco Use: Former     Smokeless Tobacco Use: Never     Passive Exposure: Not on file   Financial Resource Strain: Unknown (12/5/2024)    Financial Resource Strain     Within the past 12 months, have you or your family members you live with been unable to get utilities (heat, electricity) when it was really needed?: Patient unable to answer   Alcohol Use: Not on file   Transportation Needs: Unknown (12/5/2024)    Transportation Needs     Within the past 12 months, has lack of transportation kept you from medical appointments, getting your medicines, non-medical meetings or appointments, work, or from getting things that you need?: Patient unable to answer   Physical Activity: Not on file   Interpersonal Safety: Low Risk  (12/5/2024)    Interpersonal Safety     Do you feel physically and emotionally safe where you currently live?: Yes     Within the past 12 months, have you been hit, slapped, kicked or otherwise physically hurt by someone?: No     Within the past 12 months, have you been humiliated or emotionally abused in other ways by your partner or ex-partner?: No   Stress: Not on file   Social Connections: Unknown (7/5/2024)    Received from [facility]    Social Connections     Frequency of Communication with Friends and  "Family: Not on file   Health Literacy: Not on file       Functional Status:  Prior to admission patient needed assistance:   Dependent ADLs:: Ambulation-walker  Dependent IADLs:: Cleaning, Laundry, Medication Management, Money Management, Meal Preparation, Shopping, Transportation  Assesssment of Functional Status: Not at  functional baseline, Not at baseline with ADL Functioning, Has complex medical needs, requires placement in a facility    Mental Health Status:  Mental Health Status: No Current Concerns       Chemical Dependency Status:  Chemical Dependency Status: No Current Concerns             Values/Beliefs:  Spiritual, Cultural Beliefs, Shinto Practices, Values that affect care: no               Discussed  Partnership in Safe Discharge Planning  document with patient/family: Yes      Additional Information:  Referral received to assist with discharge planning and services.    CM placed call to the Pt's daughter María Elena # [phone]    Confirmed the Pt lives at the [facility]--Not a Memory Care Unit       Current Assisted Facility :  [place] [address]  # [phone]      At baseline the Pt ambulates very short distances in his apartment. More stand pivot to chair/wheelchair.   Daughter states he is a very private person. The DEE staff enter his apartment on a \"very short term basis.\"    The Pt has been dressing himself, does his own showers, etc.   He was standing to pivot transfer himself to his electric scooter and drive down to the main dinning room for all his meals.     The DEE staff assist with Medication Administration, housekeeping, laundry.     Dtr María Elena stated the Pt had been in a different Memory Care [facility] and \"it had not been going well\" so they decided to move him.   Since he is now more wheelchair dependent - [facility] did not see him as a \"flight risk\" and felt he would be manageable in DEE " and did not require Memory Care at this time.       CM discussed the Pt's current needs with Daughter.   Staff reporting agitation, refusing cares. Striking out.   Needing Assist of 1-2.    Daughter acknowledges the staff at the [facility] would not be able to safely manage his care at this level. States he would not be successful in a TCU as he is not cooperative with therapies on a consistent basis.   Feels the most appropriate plan would be to look into possible transfer into Memory Care at [facility] -If an opening is available.         Next Step: Av Ring agreed to place call immediately to the RN Director at [facility] to inquire if available openings in Memory Care exist.   -- CM will also call the RN director to fax needed clinicals for their assessment.         JOSE ROBERTO Ferrer  [facility] [phone]   [facility]  [phone]

## 2024-12-06 NOTE — PLAN OF CARE
"Pt improving. He is more alert/oriented only to self. He is very talkative throughout the night and did not sleep tonight. He con't to pull at gown and oximeter, occas takes off nasal cannula.   LS con't to be course, occas cough noted.   He did not urinate for 9 hrs and attempted to use the urinal w/o success. Bladder scan for 638, straight cath for 750 mL. No BM this shift.   Norepi gtt has been off since approx 11 pm and pt maintaining BP's.   Currently refusing to keep O2sat monitor on his finger or toe. He says \"you wouldn't be able to sleep either if that bright light was shining in your eyes\".   1:1 sitter at bedside throughout the night.     "

## 2024-12-07 ENCOUNTER — PATIENT OUTREACH (OUTPATIENT)
Dept: CARE COORDINATION | Facility: CLINIC | Age: 79
End: 2024-12-07
Payer: COMMERCIAL

## 2024-12-07 NOTE — PROGRESS NOTES
[facility] Care Resource [place]    Background: Transitional Care Management program identified per system criteria and reviewed by [facility] team for possible outreach.    Assessment: Upon chart review, [facility] Team member will not proceed with patient outreach related to this episode of Transitional Care Management program due to reason below:    Patient has discharged to a Memory Care, Long-term Care, Assisted Living or Group Home where patient is receiving on-site support with their daily cares, including support with hospital follow up plan.    Plan: Transitional Care Management episode addressed appropriately per reason noted above.      MARLEN Castle  Connected Care Resource [place], [facility]    *Connected Care Resource Team does NOT follow patient ongoing. Referrals are identified based on internal discharge reports and the outreach is to ensure patient has an understanding of their discharge instructions.

## 2024-12-09 LAB — BACTERIA BLD CULT: NO GROWTH

## (undated) DEVICE — GLOVE BIOGEL PI MICRO INDICATOR UNDERGLOVE SZ 8.5 48985

## (undated) DEVICE — SU VICRYL 2-0 CP-1 27" UND J266H

## (undated) DEVICE — LINEN TOWEL PACK X5 5464

## (undated) DEVICE — DECANTER BAG 2002S

## (undated) DEVICE — SOLUTION WOUND CLEANSING 3/4OZ 10% PVP EA-L3011FB-50

## (undated) DEVICE — BLADE SAW SAGITTAL STRK 25X90X1.37MM 4H SYS 6 6125-137-090

## (undated) DEVICE — SU ETHIBOND 5 V-37 4X30" MB66G

## (undated) DEVICE — ESU GROUND PAD UNIVERSAL W/O CORD

## (undated) DEVICE — SPONGE LAP 18X18" X8435

## (undated) DEVICE — SU VICRYL 0 CP-1 27" J467H

## (undated) DEVICE — SU DERMABOND ADVANCED .7ML DNX12

## (undated) DEVICE — GLOVE BIOGEL PI SZ 8.5 40885

## (undated) DEVICE — SOL NACL 0.9% IRRIG 1000ML BOTTLE 2F7124

## (undated) DEVICE — SOL WATER IRRIG 1000ML BOTTLE 2F7114

## (undated) DEVICE — PACK TOTAL HIP W/POUCH SOP15HPFSM

## (undated) DEVICE — SU MONOCRYL 3-0 PS-2 27" Y427H

## (undated) DEVICE — SUCTION IRR SYSTEM W/O TIP INTERPULSE HANDPIECE 0210-100-000

## (undated) DEVICE — SOL NACL 0.9% INJ 1000ML BAG 2B1324X

## (undated) DEVICE — DRSG AQUACEL AG HYDROFIBER  3.5X10" 422605

## (undated) DEVICE — IMM PILLOW ABDUCT HIP MED 31143061

## (undated) DEVICE — MANIFOLD NEPTUNE 4 PORT 700-20

## (undated) DEVICE — SPONGE LAP 4X18" X8415

## (undated) DEVICE — DRSG AQUACEL AG HYDROFIBER 3.5X6" 422604

## (undated) DEVICE — DRILL BIT BIOM QUICK CONNECTING RING LOCK 3.2X20MM 31-323220

## (undated) RX ORDER — ONDANSETRON 2 MG/ML
INJECTION INTRAMUSCULAR; INTRAVENOUS
Status: DISPENSED
Start: 2024-02-05

## (undated) RX ORDER — PROPOFOL 10 MG/ML
INJECTION, EMULSION INTRAVENOUS
Status: DISPENSED
Start: 2024-02-05

## (undated) RX ORDER — ACETAMINOPHEN 325 MG/1
TABLET ORAL
Status: DISPENSED
Start: 2024-02-05

## (undated) RX ORDER — DEXAMETHASONE SODIUM PHOSPHATE 4 MG/ML
INJECTION, SOLUTION INTRA-ARTICULAR; INTRALESIONAL; INTRAMUSCULAR; INTRAVENOUS; SOFT TISSUE
Status: DISPENSED
Start: 2024-02-05

## (undated) RX ORDER — EPHEDRINE SULFATE 50 MG/ML
INJECTION, SOLUTION INTRAMUSCULAR; INTRAVENOUS; SUBCUTANEOUS
Status: DISPENSED
Start: 2024-02-05

## (undated) RX ORDER — CEFAZOLIN SODIUM/WATER 2 G/20 ML
SYRINGE (ML) INTRAVENOUS
Status: DISPENSED
Start: 2024-02-05

## (undated) RX ORDER — TRANEXAMIC ACID 650 MG/1
TABLET ORAL
Status: DISPENSED
Start: 2024-02-05